# Patient Record
Sex: MALE | Race: WHITE | NOT HISPANIC OR LATINO | URBAN - METROPOLITAN AREA
[De-identification: names, ages, dates, MRNs, and addresses within clinical notes are randomized per-mention and may not be internally consistent; named-entity substitution may affect disease eponyms.]

---

## 2020-03-02 ENCOUNTER — EMERGENCY (EMERGENCY)
Facility: HOSPITAL | Age: 80
LOS: 1 days | Discharge: HOME | End: 2020-03-02
Attending: EMERGENCY MEDICINE | Admitting: EMERGENCY MEDICINE
Payer: MEDICARE

## 2020-03-02 VITALS
HEIGHT: 69 IN | RESPIRATION RATE: 18 BRPM | SYSTOLIC BLOOD PRESSURE: 127 MMHG | TEMPERATURE: 98 F | DIASTOLIC BLOOD PRESSURE: 64 MMHG | WEIGHT: 143.96 LBS | OXYGEN SATURATION: 97 % | HEART RATE: 107 BPM

## 2020-03-02 VITALS
TEMPERATURE: 98 F | SYSTOLIC BLOOD PRESSURE: 107 MMHG | HEART RATE: 84 BPM | OXYGEN SATURATION: 98 % | RESPIRATION RATE: 18 BRPM | DIASTOLIC BLOOD PRESSURE: 68 MMHG

## 2020-03-02 PROCEDURE — 99283 EMERGENCY DEPT VISIT LOW MDM: CPT

## 2020-03-02 NOTE — ED ADULT NURSE NOTE - NSIMPLEMENTINTERV_GEN_ALL_ED
Implemented All Fall with Harm Risk Interventions:  [place] to call system. Call bell, personal items and telephone within reach. Instruct patient to call for assistance. Room bathroom lighting operational. Non-slip footwear when patient is off stretcher. Physically safe environment: no spills, clutter or unnecessary equipment. Stretcher in lowest position, wheels locked, appropriate side rails in place. Provide visual cue, wrist band, yellow gown, etc. Monitor gait and stability. Monitor for mental status changes and reorient to person, place, and time. Review medications for side effects contributing to fall risk. Reinforce activity limits and safety measures with patient and family. Provide visual clues: red socks.

## 2020-03-02 NOTE — ED ADULT TRIAGE NOTE - CHIEF COMPLAINT QUOTE
Pt BIBA for c/o mechanical fall, states a "melody of wind" made him lose his balance, denies LOC, + abrasions to fingers, + knee pain, no head strike/not on AC's

## 2020-03-02 NOTE — ED PROVIDER NOTE - PATIENT PORTAL LINK FT
You can access the FollowMyHealth Patient Portal offered by [facility] by registering at the following website: [email or web address]. By joining [employer]’s FollowMyHealth portal, you will also be able to view your health information using other applications (apps) compatible with our system.

## 2020-03-02 NOTE — ED PROVIDER NOTE - OBJECTIVE STATEMENT
78 yo male biba after witnessed trip and fall. Pt has no physical complaint. States bystander called 911 and when ambulance arrived, he refused medical treatment. Brought to ED anyway by EMS. Denies any physical complaints. Denies LOC/HA/N/V/extremity pain.

## 2020-03-03 ENCOUNTER — EMERGENCY (EMERGENCY)
Facility: HOSPITAL | Age: 80
LOS: 1 days | Discharge: HOME | End: 2020-03-03
Attending: EMERGENCY MEDICINE | Admitting: EMERGENCY MEDICINE
Payer: MEDICARE

## 2020-03-03 VITALS
SYSTOLIC BLOOD PRESSURE: 151 MMHG | HEIGHT: 69 IN | OXYGEN SATURATION: 97 % | TEMPERATURE: 97 F | DIASTOLIC BLOOD PRESSURE: 87 MMHG | WEIGHT: 143.96 LBS | RESPIRATION RATE: 18 BRPM | HEART RATE: 90 BPM

## 2020-03-03 DIAGNOSIS — R53.1 WEAKNESS: ICD-10-CM

## 2020-03-03 DIAGNOSIS — N39.0 URINARY TRACT INFECTION, SITE NOT SPECIFIED: ICD-10-CM

## 2020-03-03 LAB
ALBUMIN SERPL ELPH-MCNC: 3.6 G/DL — SIGNIFICANT CHANGE UP (ref 3.4–5)
ALP SERPL-CCNC: 95 U/L — SIGNIFICANT CHANGE UP (ref 40–120)
ALT FLD-CCNC: 110 U/L — HIGH (ref 12–42)
AMPHET UR-MCNC: NEGATIVE — SIGNIFICANT CHANGE UP
ANION GAP SERPL CALC-SCNC: 15 MMOL/L — SIGNIFICANT CHANGE UP (ref 9–16)
APPEARANCE UR: CLEAR — SIGNIFICANT CHANGE UP
AST SERPL-CCNC: 440 U/L — HIGH (ref 15–37)
BACTERIA # UR AUTO: PRESENT /HPF
BARBITURATES UR SCN-MCNC: NEGATIVE — SIGNIFICANT CHANGE UP
BASOPHILS # BLD AUTO: 0.01 K/UL — SIGNIFICANT CHANGE UP (ref 0–0.2)
BASOPHILS NFR BLD AUTO: 0.1 % — SIGNIFICANT CHANGE UP (ref 0–2)
BENZODIAZ UR-MCNC: NEGATIVE — SIGNIFICANT CHANGE UP
BILIRUB SERPL-MCNC: 1.2 MG/DL — SIGNIFICANT CHANGE UP (ref 0.2–1.2)
BILIRUB UR-MCNC: ABNORMAL
BUN SERPL-MCNC: 45 MG/DL — HIGH (ref 7–23)
CALCIUM SERPL-MCNC: 9 MG/DL — SIGNIFICANT CHANGE UP (ref 8.5–10.5)
CHLORIDE SERPL-SCNC: 104 MMOL/L — SIGNIFICANT CHANGE UP (ref 96–108)
CO2 SERPL-SCNC: 23 MMOL/L — SIGNIFICANT CHANGE UP (ref 22–31)
COCAINE METAB.OTHER UR-MCNC: NEGATIVE — SIGNIFICANT CHANGE UP
COLOR SPEC: YELLOW — SIGNIFICANT CHANGE UP
CREAT SERPL-MCNC: 1.28 MG/DL — SIGNIFICANT CHANGE UP (ref 0.5–1.3)
DIFF PNL FLD: ABNORMAL
EOSINOPHIL # BLD AUTO: 0 K/UL — SIGNIFICANT CHANGE UP (ref 0–0.5)
EOSINOPHIL NFR BLD AUTO: 0 % — SIGNIFICANT CHANGE UP (ref 0–6)
EPI CELLS # UR: ABNORMAL /HPF (ref 0–5)
ETHANOL SERPL-MCNC: <3 MG/DL — SIGNIFICANT CHANGE UP
GLUCOSE SERPL-MCNC: 85 MG/DL — SIGNIFICANT CHANGE UP (ref 70–99)
GLUCOSE UR QL: NEGATIVE — SIGNIFICANT CHANGE UP
HCT VFR BLD CALC: 35.6 % — LOW (ref 39–50)
HGB BLD-MCNC: 11.7 G/DL — LOW (ref 13–17)
HYALINE CASTS # UR AUTO: ABNORMAL /LPF (ref 0–2)
IMM GRANULOCYTES NFR BLD AUTO: 0.4 % — SIGNIFICANT CHANGE UP (ref 0–1.5)
KETONES UR-MCNC: 40 MG/DL
LEUKOCYTE ESTERASE UR-ACNC: NEGATIVE — SIGNIFICANT CHANGE UP
LYMPHOCYTES # BLD AUTO: 0.33 K/UL — LOW (ref 1–3.3)
LYMPHOCYTES # BLD AUTO: 2.9 % — LOW (ref 13–44)
MAGNESIUM SERPL-MCNC: 2.5 MG/DL — SIGNIFICANT CHANGE UP (ref 1.6–2.6)
MANUAL SMEAR VERIFICATION: SIGNIFICANT CHANGE UP
MCHC RBC-ENTMCNC: 32.7 PG — SIGNIFICANT CHANGE UP (ref 27–34)
MCHC RBC-ENTMCNC: 32.9 GM/DL — SIGNIFICANT CHANGE UP (ref 32–36)
MCV RBC AUTO: 99.4 FL — SIGNIFICANT CHANGE UP (ref 80–100)
METHADONE UR-MCNC: NEGATIVE — SIGNIFICANT CHANGE UP
MONOCYTES # BLD AUTO: 0.82 K/UL — SIGNIFICANT CHANGE UP (ref 0–0.9)
MONOCYTES NFR BLD AUTO: 7.3 % — SIGNIFICANT CHANGE UP (ref 2–14)
NEUTROPHILS # BLD AUTO: 10.02 K/UL — HIGH (ref 1.8–7.4)
NEUTROPHILS NFR BLD AUTO: 89.3 % — HIGH (ref 43–77)
NITRITE UR-MCNC: NEGATIVE — SIGNIFICANT CHANGE UP
NRBC # BLD: 0 /100 WBCS — SIGNIFICANT CHANGE UP (ref 0–0)
OPIATES UR-MCNC: NEGATIVE — SIGNIFICANT CHANGE UP
PCP SPEC-MCNC: SIGNIFICANT CHANGE UP
PCP UR-MCNC: NEGATIVE — SIGNIFICANT CHANGE UP
PH UR: 5.5 — SIGNIFICANT CHANGE UP (ref 5–8)
PLAT MORPH BLD: NORMAL — SIGNIFICANT CHANGE UP
PLATELET # BLD AUTO: 229 K/UL — SIGNIFICANT CHANGE UP (ref 150–400)
PLATELET COUNT - ESTIMATE: NORMAL — SIGNIFICANT CHANGE UP
POTASSIUM SERPL-MCNC: 3.6 MMOL/L — SIGNIFICANT CHANGE UP (ref 3.5–5.3)
POTASSIUM SERPL-SCNC: 3.6 MMOL/L — SIGNIFICANT CHANGE UP (ref 3.5–5.3)
PROT SERPL-MCNC: 6.8 G/DL — SIGNIFICANT CHANGE UP (ref 6.4–8.2)
PROT UR-MCNC: 30 MG/DL
RBC # BLD: 3.58 M/UL — LOW (ref 4.2–5.8)
RBC # FLD: 14.1 % — SIGNIFICANT CHANGE UP (ref 10.3–14.5)
RBC BLD AUTO: NORMAL — SIGNIFICANT CHANGE UP
RBC CASTS # UR COMP ASSIST: > 10 /HPF
SODIUM SERPL-SCNC: 142 MMOL/L — SIGNIFICANT CHANGE UP (ref 132–145)
SP GR SPEC: >=1.03 — SIGNIFICANT CHANGE UP (ref 1–1.03)
THC UR QL: NEGATIVE — SIGNIFICANT CHANGE UP
UROBILINOGEN FLD QL: 0.2 E.U./DL — SIGNIFICANT CHANGE UP
WBC # BLD: 11.23 K/UL — HIGH (ref 3.8–10.5)
WBC # FLD AUTO: 11.23 K/UL — HIGH (ref 3.8–10.5)
WBC UR QL: < 5 /HPF — SIGNIFICANT CHANGE UP

## 2020-03-03 PROCEDURE — 72125 CT NECK SPINE W/O DYE: CPT | Mod: 26

## 2020-03-03 PROCEDURE — 70450 CT HEAD/BRAIN W/O DYE: CPT | Mod: 26

## 2020-03-03 PROCEDURE — 99284 EMERGENCY DEPT VISIT MOD MDM: CPT

## 2020-03-03 RX ORDER — AZTREONAM 2 G
1 VIAL (EA) INJECTION
Qty: 20 | Refills: 0
Start: 2020-03-03 | End: 2020-03-12

## 2020-03-03 RX ORDER — CEFUROXIME AXETIL 250 MG
1 TABLET ORAL
Qty: 20 | Refills: 0
Start: 2020-03-03 | End: 2020-03-12

## 2020-03-03 RX ORDER — CEFUROXIME AXETIL 250 MG
250 TABLET ORAL ONCE
Refills: 0 | Status: COMPLETED | OUTPATIENT
Start: 2020-03-03 | End: 2020-03-03

## 2020-03-03 RX ADMIN — Medication 250 MILLIGRAM(S): at 20:25

## 2020-03-03 NOTE — ED ADULT NURSE NOTE - CHPI ED NUR SYMPTOMS NEG
no pain/no chills/no nausea/no weakness/no vomiting/no decreased eating/drinking/no dizziness/no fever/no tingling

## 2020-03-03 NOTE — ED PROVIDER NOTE - PATIENT PORTAL LINK FT
You can access the FollowMyHealth Patient Portal offered by [facility] by registering at the following website: [email or web address]. By joining [employer]’s FollowMyHealth portal, you will also be able to view your health information using other applications (apps) compatible with our system.

## 2020-03-03 NOTE — ED PROVIDER NOTE - PROGRESS NOTE DETAILS
Pt was able to walk to bathroom. UA suggestive of UTI, will culture and start cephalosporin. ON rexamination pt had an area in lower extremities that shows b/l lower extremity erythema, seems chronic, poor hygiene. Will also give script for bactrim in case of overlying celulitic process.

## 2020-03-03 NOTE — ED PROVIDER NOTE - NEUROLOGICAL, MLM
Alert and oriented, no focal deficits. 5/5 strength in bilateral LE. Pt able to stand but appears to be unsteady when attempting to walk.

## 2020-03-03 NOTE — ED PROVIDER NOTE - MUSCULOSKELETAL, MLM
Spine appears normal, c-spine is atraumatic, range of motion is not limited, no muscle or joint tenderness

## 2020-03-03 NOTE — ED PROVIDER NOTE - OBJECTIVE STATEMENT
79 y o male with no pertinent PMHX was BIBA for medical evaluation. Pt was found in the garage of a [employer] c/o weakness and unsteadiness in his legs. Pt notes difficulty ambulating secondary to weakness. He was last seen here yesterday for a mechanical fall but as per document, he denied medical aid on arrival to ED. Pt denies pain, numbness, tingling, or deformities to LE. He denies etoh use or hx of smoking. No other medical complaints endorsed.

## 2020-03-03 NOTE — ED ADULT NURSE NOTE - CHIEF COMPLAINT QUOTE
BIBA after [employer] worker called 911 due to patient in their garage. as per EMS, pt was weak in his legs, unable to bear weight. pt has no complaints at this time but admits to fall sustained yesterday where he was seen at [facility]. pt is A,A&Ox3.

## 2020-03-03 NOTE — ED PROVIDER NOTE - CONSTITUTIONAL, MLM
normal... Disheveled, awake, alert, oriented to person, place, time/situation and in no apparent distress. Pt able to provide hx.

## 2020-03-03 NOTE — ED ADULT TRIAGE NOTE - CHIEF COMPLAINT QUOTE
BIBA after [employer] worker called 911 due to patient in their garage. as per EMS, pt was weak in his legs, unable to bear weight. pt has no complaints at this time but admits to fall sustained yesterday where he was seen at [facility]. pt is A,A&Ox3.

## 2020-03-04 ENCOUNTER — INPATIENT (INPATIENT)
Facility: HOSPITAL | Age: 80
LOS: 2 days | Discharge: NURSING, REHAB OR LONG-TERM CARE | DRG: 565 | End: 2020-03-07
Attending: STUDENT IN AN ORGANIZED HEALTH CARE EDUCATION/TRAINING PROGRAM | Admitting: STUDENT IN AN ORGANIZED HEALTH CARE EDUCATION/TRAINING PROGRAM
Payer: MEDICARE

## 2020-03-04 VITALS
WEIGHT: 139.99 LBS | OXYGEN SATURATION: 98 % | DIASTOLIC BLOOD PRESSURE: 68 MMHG | TEMPERATURE: 99 F | SYSTOLIC BLOOD PRESSURE: 135 MMHG | HEIGHT: 69 IN | HEART RATE: 98 BPM | RESPIRATION RATE: 17 BRPM

## 2020-03-04 DIAGNOSIS — R79.89 OTHER SPECIFIED ABNORMAL FINDINGS OF BLOOD CHEMISTRY: ICD-10-CM

## 2020-03-04 DIAGNOSIS — Z91.89 OTHER SPECIFIED PERSONAL RISK FACTORS, NOT ELSEWHERE CLASSIFIED: ICD-10-CM

## 2020-03-04 DIAGNOSIS — R63.8 OTHER SYMPTOMS AND SIGNS CONCERNING FOOD AND FLUID INTAKE: ICD-10-CM

## 2020-03-04 DIAGNOSIS — L03.90 CELLULITIS, UNSPECIFIED: ICD-10-CM
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
## 2020-03-04 DIAGNOSIS — M62.82 RHABDOMYOLYSIS: ICD-10-CM

## 2020-03-04 LAB
ALBUMIN SERPL ELPH-MCNC: 3.4 G/DL — SIGNIFICANT CHANGE UP (ref 3.4–5)
ALP SERPL-CCNC: 97 U/L — SIGNIFICANT CHANGE UP (ref 40–120)
ALT FLD-CCNC: 118 U/L — HIGH (ref 12–42)
ANION GAP SERPL CALC-SCNC: 13 MMOL/L — SIGNIFICANT CHANGE UP (ref 9–16)
ANION GAP SERPL CALC-SCNC: 14 MMOL/L — SIGNIFICANT CHANGE UP (ref 9–16)
APTT BLD: 26.6 SEC — LOW (ref 27.5–36.3)
AST SERPL-CCNC: 452 U/L — HIGH (ref 15–37)
BASOPHILS # BLD AUTO: 0.01 K/UL — SIGNIFICANT CHANGE UP (ref 0–0.2)
BASOPHILS NFR BLD AUTO: 0.1 % — SIGNIFICANT CHANGE UP (ref 0–2)
BILIRUB SERPL-MCNC: 1.1 MG/DL — SIGNIFICANT CHANGE UP (ref 0.2–1.2)
BUN SERPL-MCNC: 33 MG/DL — HIGH (ref 7–23)
BUN SERPL-MCNC: 44 MG/DL — HIGH (ref 7–23)
CALCIUM SERPL-MCNC: 7.6 MG/DL — LOW (ref 8.5–10.5)
CALCIUM SERPL-MCNC: 9 MG/DL — SIGNIFICANT CHANGE UP (ref 8.5–10.5)
CHLORIDE SERPL-SCNC: 103 MMOL/L — SIGNIFICANT CHANGE UP (ref 96–108)
CHLORIDE SERPL-SCNC: 110 MMOL/L — HIGH (ref 96–108)
CK MB CFR SERPL CALC: 57.9 NG/ML — HIGH (ref 0–6.7)
CK SERPL-CCNC: 4512 U/L — HIGH (ref 30–200)
CK SERPL-CCNC: 5850 U/L — HIGH (ref 39–308)
CK SERPL-CCNC: CRITICAL HIGH U/L (ref 39–308)
CO2 SERPL-SCNC: 21 MMOL/L — LOW (ref 22–31)
CO2 SERPL-SCNC: 22 MMOL/L — SIGNIFICANT CHANGE UP (ref 22–31)
CREAT SERPL-MCNC: 0.83 MG/DL — SIGNIFICANT CHANGE UP (ref 0.5–1.3)
CREAT SERPL-MCNC: 1.17 MG/DL — SIGNIFICANT CHANGE UP (ref 0.5–1.3)
EOSINOPHIL # BLD AUTO: 0 K/UL — SIGNIFICANT CHANGE UP (ref 0–0.5)
EOSINOPHIL NFR BLD AUTO: 0 % — SIGNIFICANT CHANGE UP (ref 0–6)
ETHANOL SERPL-MCNC: <3 MG/DL — SIGNIFICANT CHANGE UP
GLUCOSE SERPL-MCNC: 74 MG/DL — SIGNIFICANT CHANGE UP (ref 70–99)
GLUCOSE SERPL-MCNC: 99 MG/DL — SIGNIFICANT CHANGE UP (ref 70–99)
HCT VFR BLD CALC: 35.1 % — LOW (ref 39–50)
HGB BLD-MCNC: 11.7 G/DL — LOW (ref 13–17)
IMM GRANULOCYTES NFR BLD AUTO: 0.4 % — SIGNIFICANT CHANGE UP (ref 0–1.5)
INR BLD: 1.25 — HIGH (ref 0.88–1.16)
LACTATE SERPL-SCNC: 1.6 MMOL/L — SIGNIFICANT CHANGE UP (ref 0.4–2)
LYMPHOCYTES # BLD AUTO: 0.37 K/UL — LOW (ref 1–3.3)
LYMPHOCYTES # BLD AUTO: 3.7 % — LOW (ref 13–44)
MAGNESIUM SERPL-MCNC: 2.4 MG/DL — SIGNIFICANT CHANGE UP (ref 1.6–2.6)
MCHC RBC-ENTMCNC: 32.8 PG — SIGNIFICANT CHANGE UP (ref 27–34)
MCHC RBC-ENTMCNC: 33.3 GM/DL — SIGNIFICANT CHANGE UP (ref 32–36)
MCV RBC AUTO: 98.3 FL — SIGNIFICANT CHANGE UP (ref 80–100)
MONOCYTES # BLD AUTO: 0.83 K/UL — SIGNIFICANT CHANGE UP (ref 0–0.9)
MONOCYTES NFR BLD AUTO: 8.3 % — SIGNIFICANT CHANGE UP (ref 2–14)
NEUTROPHILS # BLD AUTO: 8.78 K/UL — HIGH (ref 1.8–7.4)
NEUTROPHILS NFR BLD AUTO: 87.5 % — HIGH (ref 43–77)
NRBC # BLD: 0 /100 WBCS — SIGNIFICANT CHANGE UP (ref 0–0)
NT-PROBNP SERPL-SCNC: 795 PG/ML — HIGH
PCP SPEC-MCNC: SIGNIFICANT CHANGE UP
PLATELET # BLD AUTO: 222 K/UL — SIGNIFICANT CHANGE UP (ref 150–400)
POTASSIUM SERPL-MCNC: 2.9 MMOL/L — CRITICAL LOW (ref 3.5–5.3)
POTASSIUM SERPL-MCNC: 3.4 MMOL/L — LOW (ref 3.5–5.3)
POTASSIUM SERPL-SCNC: 2.9 MMOL/L — CRITICAL LOW (ref 3.5–5.3)
POTASSIUM SERPL-SCNC: 3.4 MMOL/L — LOW (ref 3.5–5.3)
PROT SERPL-MCNC: 6.6 G/DL — SIGNIFICANT CHANGE UP (ref 6.4–8.2)
PROTHROM AB SERPL-ACNC: 14 SEC — HIGH (ref 10–12.9)
RBC # BLD: 3.57 M/UL — LOW (ref 4.2–5.8)
RBC # FLD: 14.1 % — SIGNIFICANT CHANGE UP (ref 10.3–14.5)
SODIUM SERPL-SCNC: 139 MMOL/L — SIGNIFICANT CHANGE UP (ref 132–145)
SODIUM SERPL-SCNC: 144 MMOL/L — SIGNIFICANT CHANGE UP (ref 132–145)
TROPONIN I SERPL-MCNC: 0.19 NG/ML — HIGH (ref 0.02–0.06)
TROPONIN I SERPL-MCNC: 0.24 NG/ML — HIGH (ref 0.02–0.06)
TROPONIN T SERPL-MCNC: 0.07 NG/ML — CRITICAL HIGH (ref 0–0.01)
WBC # BLD: 10.03 K/UL — SIGNIFICANT CHANGE UP (ref 3.8–10.5)
WBC # FLD AUTO: 10.03 K/UL — SIGNIFICANT CHANGE UP (ref 3.8–10.5)

## 2020-03-04 PROCEDURE — 71045 X-RAY EXAM CHEST 1 VIEW: CPT | Mod: 26,77

## 2020-03-04 PROCEDURE — 99285 EMERGENCY DEPT VISIT HI MDM: CPT | Mod: 25

## 2020-03-04 PROCEDURE — 70498 CT ANGIOGRAPHY NECK: CPT | Mod: 26

## 2020-03-04 PROCEDURE — 70450 CT HEAD/BRAIN W/O DYE: CPT | Mod: 26,59

## 2020-03-04 PROCEDURE — 70496 CT ANGIOGRAPHY HEAD: CPT | Mod: 26

## 2020-03-04 PROCEDURE — 93010 ELECTROCARDIOGRAM REPORT: CPT | Mod: 76

## 2020-03-04 PROCEDURE — 74177 CT ABD & PELVIS W/CONTRAST: CPT | Mod: 26

## 2020-03-04 PROCEDURE — 99223 1ST HOSP IP/OBS HIGH 75: CPT | Mod: GC

## 2020-03-04 PROCEDURE — 71045 X-RAY EXAM CHEST 1 VIEW: CPT | Mod: 26

## 2020-03-04 RX ORDER — CEFTRIAXONE 500 MG/1
1000 INJECTION, POWDER, FOR SOLUTION INTRAMUSCULAR; INTRAVENOUS ONCE
Refills: 0 | Status: COMPLETED | OUTPATIENT
Start: 2020-03-04 | End: 2020-03-04

## 2020-03-04 RX ORDER — SODIUM CHLORIDE 9 MG/ML
1000 INJECTION INTRAMUSCULAR; INTRAVENOUS; SUBCUTANEOUS ONCE
Refills: 0 | Status: COMPLETED | OUTPATIENT
Start: 2020-03-04 | End: 2020-03-04

## 2020-03-04 RX ORDER — INFLUENZA VIRUS VACCINE 15; 15; 15; 15 UG/.5ML; UG/.5ML; UG/.5ML; UG/.5ML
0.5 SUSPENSION INTRAMUSCULAR ONCE
Refills: 0 | Status: DISCONTINUED | OUTPATIENT
Start: 2020-03-04 | End: 2020-03-07

## 2020-03-04 RX ORDER — ENOXAPARIN SODIUM 100 MG/ML
40 INJECTION SUBCUTANEOUS EVERY 24 HOURS
Refills: 0 | Status: DISCONTINUED | OUTPATIENT
Start: 2020-03-05 | End: 2020-03-07

## 2020-03-04 RX ORDER — CEPHALEXIN 500 MG
500 CAPSULE ORAL EVERY 6 HOURS
Refills: 0 | Status: DISCONTINUED | OUTPATIENT
Start: 2020-03-04 | End: 2020-03-06

## 2020-03-04 RX ORDER — POTASSIUM CHLORIDE 20 MEQ
40 PACKET (EA) ORAL
Refills: 0 | Status: COMPLETED | OUTPATIENT
Start: 2020-03-04 | End: 2020-03-05

## 2020-03-04 RX ORDER — SODIUM CHLORIDE 9 MG/ML
1000 INJECTION INTRAMUSCULAR; INTRAVENOUS; SUBCUTANEOUS
Refills: 0 | Status: DISCONTINUED | OUTPATIENT
Start: 2020-03-04 | End: 2020-03-05

## 2020-03-04 RX ORDER — ASPIRIN/CALCIUM CARB/MAGNESIUM 324 MG
324 TABLET ORAL ONCE
Refills: 0 | Status: COMPLETED | OUTPATIENT
Start: 2020-03-04 | End: 2020-03-04

## 2020-03-04 RX ADMIN — SODIUM CHLORIDE 1000 MILLILITER(S): 9 INJECTION INTRAMUSCULAR; INTRAVENOUS; SUBCUTANEOUS at 05:36

## 2020-03-04 RX ADMIN — SODIUM CHLORIDE 1000 MILLILITER(S): 9 INJECTION INTRAMUSCULAR; INTRAVENOUS; SUBCUTANEOUS at 13:08

## 2020-03-04 RX ADMIN — SODIUM CHLORIDE 1000 MILLILITER(S): 9 INJECTION INTRAMUSCULAR; INTRAVENOUS; SUBCUTANEOUS at 14:04

## 2020-03-04 RX ADMIN — Medication 40 MILLIEQUIVALENT(S): at 22:14

## 2020-03-04 RX ADMIN — CEFTRIAXONE 1000 MILLIGRAM(S): 500 INJECTION, POWDER, FOR SOLUTION INTRAMUSCULAR; INTRAVENOUS at 04:45

## 2020-03-04 RX ADMIN — SODIUM CHLORIDE 500 MILLILITER(S): 9 INJECTION INTRAMUSCULAR; INTRAVENOUS; SUBCUTANEOUS at 04:46

## 2020-03-04 RX ADMIN — SODIUM CHLORIDE 125 MILLILITER(S): 9 INJECTION INTRAMUSCULAR; INTRAVENOUS; SUBCUTANEOUS at 22:14

## 2020-03-04 RX ADMIN — CEFTRIAXONE 100 MILLIGRAM(S): 500 INJECTION, POWDER, FOR SOLUTION INTRAMUSCULAR; INTRAVENOUS at 03:22

## 2020-03-04 RX ADMIN — SODIUM CHLORIDE 125 MILLILITER(S): 9 INJECTION INTRAMUSCULAR; INTRAVENOUS; SUBCUTANEOUS at 13:52

## 2020-03-04 RX ADMIN — Medication 324 MILLIGRAM(S): at 05:39

## 2020-03-04 NOTE — PROVIDER CONTACT NOTE (CRITICAL VALUE NOTIFICATION) - ASSESSMENT
Pt A/Ox4, denies any CP, n/v/d, SOB, dyspnea or pain at this time. Unable to walk due to infection at this time. Pt has had multiple falls recently.

## 2020-03-04 NOTE — ED PROVIDER NOTE - ATTENDING CONTRIBUTION TO CARE
79 yom pw generalized weakness, difficulty to ambulate.  pt was seen here 2 days ago, sp trauma to head, at the time, left AMA.  pt returns earlier tonight, c/o weakness, had CT head/cervical spine done, given abx for possible UTI and cellulitis.  pt given a cane upon dc, however, returns as he was weak and having difficulty ambulating.  pt denies any cp/sob/ha/nv/abd pain.    agree w/ NP, pt ao x 1, to person, does not appear in distress, rrr, lungs clear, strength 5/5 in bl UE, strength 5/5 w/ isolation of bl LE, but not able to ambulate, labs reviewed from prior, will rpt, including ct head/cta head/neck, also added cpk and trop    initial ekg reviewed, no acute findings, however, trop 0.188 w/ normal renal function, at this time, rpt ekg done, which showed T wave inversion in II, aVF, v5, w/ hyperacute T wave in v1, pt again denies any cp/sob, rpt trop ordered again and rpt EKG done again 30 min, which showed the same flipped t wave in III, aVF, now new flipped t wave in v4, now upright v5.  rpt trop in 2 hr appears to be elevated to 0.24    with these findings, I called acute MI attending Dr. Acuna, reviewed EKG w/ pictures sent, who felt pt is not a candidate for STEMI activation.  then called to cardiology attending, again sent EKG via text, reviewed, feels pt is not having NSTEMI, [facility], will consult and obtain echo but does NOT recommend telemetry monitoring, feels pt is appropriate for regional medicine floor.

## 2020-03-04 NOTE — H&P ADULT - ATTENDING COMMENTS
patient seen and examined a/f fall and rhabdomyolysis   reviewed pertinent data, h&p      1. fall/ rhabdo:  on IVFs, monitor CK       rest of plan as above patient seen and examined a/f fall and rhabdomyolysis   reviewed pertinent data, h&p      1. inability to walk; falls/ rhabdo:  on IVFs, monitor CK; +S shaped scoliosis seen on CT a/p , ordered for MRI, followup PT recs, likely will need shoe lift and 4 wheel rolling walker   2. cellulitis: agree w/ kelfex PO     rest of plan as above patient seen and examined a/f fall and rhabdomyolysis   reviewed pertinent data, h&p    elderly male, in NAD, dry MM, s1s2, ?s3 at LLSB, apex;, lungs cta b/l; abdomen soft/nt; negative SLR test b/l; FROM of knees and legs b/l; +eccymosis at the right knee; +abrasions at the lower ext b/l; 5/5 motor b/l at all extremities; no pain on palpation of plantar/ heel of feet b/l; +warm erythema at the R anterior tib region, nontender ; normal rectal tone and perineal sensation is intact (chaperoned by MAXIMINO Goodson)     1. inability to walk; falls/ rhabdo:  on IVFs, monitor CK; +S shaped scoliosis seen on CT a/p , ordered for MRI, followup PT recs, likely will need shoe lift and 4 wheel rolling walker   2. cellulitis: agree w/ kelfex PO     rest of plan as above

## 2020-03-04 NOTE — H&P ADULT - PROBLEM SELECTOR PLAN 1
Pt with few days of unsteady gate and few falls in the past few days that claims all of them have been mechanical and due to loss of balance. Denies any loss of consciousness, headache, dizziness or blurry vision or any symptoms in the past few days. denies any PMH.   In the ED his head and neck imaging have been unremarkable.     - PT/OT eval in the morning   - vitamin B12 and folate in the AM to rule out B12 deficiency due to malnutrition Pt with few days of unsteady gate and few falls in the past few days that claims all of them have been mechanical and due to loss of balance. Denies any loss of consciousness, headache, dizziness or blurry vision or any symptoms in the past few days. denies any PMH. Neuro exam is normal.  In the ED his head and neck imaging have been unremarkable.     - MRI LSspine w/o contrast  - vitamin B12 and folate in the AM to rule out B12 deficiency due to malnutrition  - PT/OT eval in the morning Pt with few days of unsteady gait and few falls in the past few days that claims all of them have been mechanical and due to loss of balance. Denies any loss of consciousness, headache, dizziness or blurry vision or any symptoms in the past few days. denies any PMH. Neuro exam is normal.  In the ED his head and neck imaging have been unremarkable.     - MRI LSspine w/o contrast  - vitamin B12 and folate in the AM to rule out B12 deficiency due to malnutrition  - PT/OT eval in the morning

## 2020-03-04 NOTE — H&P ADULT - PROBLEM SELECTOR PLAN 4
Has a demarcated redness and slight warmness in the right leg but with no tenderness or swelling or abscess. 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 Pt denies any pain and does not remember when it has started. Did not have fever but had one time of hypothermia in ED. His WBC was 11.23 at the time of presentation and improved with few doses of ABx (including a dose of ceftriaxone).     - started him on cephalexin 500 q6 for 4 days  - will reassess in the morning Has a demarcated redness and slight warmness in the right leg but with no tenderness or swelling or abscess. 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 Pt denies any pain and does not remember when it has started. Did not have fever but had one time of hypothermia in ED. His WBC was 11.23 at the time of presentation and improved with few doses of ABx (including a dose of ceftriaxone).     - started him on cephalexin 500 q6 for 5 days  - will reassess in the morning Pt has been in ED for few days with no movement and no food. His CK was elevated as well as his Trop I (0.18 to 0.24). He had some ECG changes including TWI in inferior and lateral leads but cardiology cleared the pt. As per chart the trop changes are because of rhabdomyolysis and not ACS. Pt denies any chest pain or SOB.   Trop T was 0.07 and CKMB was 57.9 here. Pt still asymptomatic.    - Trend trops to [place]  - Will call cardiology in am to update them about the changes in trop and CKMB  - ECG for morning to monitor the changes  - echo ordered for the morning

## 2020-03-04 NOTE — ED PROVIDER NOTE - SKIN TEMP
dry/brawny legs bilat, including feet extending up to knees.  superificial skin tears to R anterior shins.  Bilat legs are equal in temperature and erythema./warm

## 2020-03-04 NOTE — PATIENT PROFILE ADULT - FUNCTIONAL SCREEN CURRENT LEVEL: SWALLOWING (IF SCORE 2 OR MORE FOR ANY ITEM, CONSULT REHAB SERVICES), MLM)
0 = swallows foods/liquids without difficulty [facility]

## 2020-03-04 NOTE — H&P ADULT - NSHPLABSRESULTS_GEN_ALL_CORE
11.7   10.03 )-----------( 222      ( 04 Mar 2020 03:18 )             35.1   03-04    144  |  110<H>  |  33<H>  ----------------------------<  74  2.9<LL>   |  21<L>  |  0.83    Ca    7.6<L>      04 Mar 2020 13:58  Mg     2.4     03-04    TPro  6.6  /  Alb  3.4  /  TBili  1.1  /  DBili  x   /  AST  452<H>  /  ALT  118<H>  /  AlkPhos  97  03-04  < from: CT Head No Cont (03.04.20 @ 04:38) >    IMPRESSION:   No acute intracranial hemorrhage, mass effect, or recent transcortical infarction. No significant change since prior CT dated 3/3/2020.    < from: CT Angio Neck w/ IV Cont (03.04.20 @ 04:14) >    IMPRESSION:  No high-grade stenosis or occlusion in the neck.IMPRESSION:  No high-grade intracranial stenosis or occlusion.    < from: CT Abdomen and Pelvis w/ IV Cont (03.04.20 @ 04:13) >    Impression:   No acute abnormality is identifiedINTERPRETATION:  CT SCAN OF ABDOMEN AND PELVIS    < from: CT Cervical Spine No Cont (03.03.20 @ 18:59) >    CT SCAN OF THE CERVICAL SPINE WITHOUT CONTRAST: IMPRESSION:     1. Evaluation moderately limited due to patient motion particularly upper cervical spine. No fracture or dislocation identified.   2. Multilevel cervical spondylosis, as above.

## 2020-03-04 NOTE — ED ADULT NURSE REASSESSMENT NOTE - NSIMPLEMENTINTERV_GEN_ALL_ED
Implemented All Fall with Harm Risk Interventions:  [place] to call system. Call bell, personal items and telephone within reach. Instruct patient to call for assistance. Room bathroom lighting operational. Non-slip footwear when patient is off stretcher. Physically safe environment: no spills, clutter or unnecessary equipment. Stretcher in lowest position, wheels locked, appropriate side rails in place. Provide visual cue, wrist band, yellow gown, etc. Monitor gait and stability. Monitor for mental status changes and reorient to person, place, and time. Review medications for side effects contributing to fall risk. Reinforce activity limits and safety measures with patient and family. Provide visual clues: red socks.
Implemented All Fall Risk Interventions:  [place] to call system. Call bell, personal items and telephone within reach. Instruct patient to call for assistance. Room bathroom lighting operational. Non-slip footwear when patient is off stretcher. Physically safe environment: no spills, clutter or unnecessary equipment. Stretcher in lowest position, wheels locked, appropriate side rails in place. Provide visual cue, wrist band, yellow gown, etc. Monitor gait and stability. Monitor for mental status changes and reorient to person, place, and time. Review medications for side effects contributing to fall risk. Reinforce activity limits and safety measures with patient and family.

## 2020-03-04 NOTE — H&P ADULT - PROBLEM SELECTOR PLAN 5
Fluids: s/p __  cc/hr, encourage PO intake   Electrolytes-replete as needed  Nutrition - DASH/TLC  Code- Full Code  DVT ppx: Lovenox  GI ppx: none needed  DIspo: [facility] monitor BMP, replete prn Has a demarcated redness and slight warmness in the right leg but with no tenderness or swelling or abscess. 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 Pt denies any pain and does not remember when it has started. Did not have fever but had one time of hypothermia in ED. His WBC was 11.23 at the time of presentation and improved with few doses of ABx (including a dose of ceftriaxone).     - started him on cephalexin 500 q6 for 5 days  - will reassess in the morning

## 2020-03-04 NOTE — ED PROVIDER NOTE - PROGRESS NOTE DETAILS
[place] state missing person list checked.  Pt is not registered as a missing person at present. CK~14,000.  Pt trop 0.188.  Pt denies CP.  Trop is likely secondary ischemia due to rhabdomyolysis.  Will start gentle IV hydration.  Repeat Trop ordered. Pt continues to have EKG changes as documented.  He denies CP/SOB.  Dr. Heath discussed with Dr. Acuna.  EKG images reviewed/shared and determined this is not a STEMI.  Pt then discussed with Dr. Hartmann, [facility] cardiology.  EKG images reviewed/shared and determined changes are not NSTEMI and sequela of rhabdomyolysis.  She recommends echo, non-tele medicine admission with cards consult. Pt discussed with Dr. Jordan and DEYSI.  Pt accepted to [facility] medicine, awaiting admission bed.

## 2020-03-04 NOTE — H&P ADULT - NSHPPHYSICALEXAM_GEN_ALL_CORE
VITAL SIGNS:  T(C): 36.6 (03-04-20 @ 21:15), Max: 37.2 (03-04-20 @ 01:51)  T(F): 97.9 (03-04-20 @ 21:15), Max: 98.9 (03-04-20 @ 01:51)  HR: 71 (03-04-20 @ 21:15) (63 - 98)  BP: 124/73 (03-04-20 @ 21:15) (124/73 - 167/84)  BP(mean): --  RR: 18 (03-04-20 @ 21:15) (16 - 18)  SpO2: 100% (03-04-20 @ 21:15) (98% - 100%)  Wt(kg): --    PHYSICAL EXAM:  Constitutional: WDWN resting comfortably in bed; NAD, cachectic   Head: NC/AT  Eyes: PERRL, EOMI, anicteric sclera  ENT: no nasal discharge; uvula midline, no oropharyngeal erythema or exudates; MMM  Neck: supple; no JVD or thyromegaly  Respiratory: CTA B/L; no W/R/R, no retractions  Cardiac: +S1/S2; RRR; no M/R/G; PMI non-displaced  Gastrointestinal: abdomen soft, NT/ND; no rebound or guarding; +BSx4  Back: spine midline, no bony tenderness or step-offs; no CVAT B/L  Extremities: WWP, no clubbing or cyanosis; no peripheral edema, has a redness in the right leg that is not tender but is slightly warmer than the other side. the redness is well demarcated. but there is no edema.    Musculoskeletal: NROM x4; no joint swelling, tenderness or erythema,   Vascular: 2+ radial, femoral, DP/PT pulses B/L  Dermatologic: skin warm, dry and intact; no rashes, has a redness in the right leg that is not tender but is slightly warmer than the other side. the redness is well demarcated. but there is no edema.   Lymphatic: no submandibular or cervical LAD  Neurologic: AAOx3; CNII-XII grossly intact; no focal deficits, walking deferred   Psychiatric: affect and characteristics of appearance, verbalizations, behaviors are appropriate

## 2020-03-04 NOTE — ED PROVIDER NOTE - OBJECTIVE STATEMENT
78 y/o M, denies PMH, returns to ED after recent evaluation approx 5 hours ago.  Pt seen in ED 2 days ago s/p reported witnessed mechanical fall.  He refused medical care and was discharged.  Today, pt returned to ED last night via EMS with c/o weakness to bilat lower extremities.  He picked up from [employer].  Pt had labs, CT head and cervical spine, and UA. Pt was diagnosed with UTI and possible early cellulitis of RLE.  He was prescribed Ceftin and Bactrim.  Pt was given a cane upon discharge.  Pt noted to have not left ED grounds.  He was sitting outside of ED and c/o difficulty ambulating when he was assisted back into department.      Now, pt noted to be alert, oriented to person only.  He is aware of his name and .  He does not know where he is, the date, or the facility.  He now states he lives independently in [place].  His listed address with prior registration is not a valid address.  Pt provided a new address and it is also an invalid address.  Pt has wallet with $100 dollar bill and small bills.  He has an old work ID from [email or web address].  Pt initially denied any complaints.  He denies fevers/chills, chest pain, cough, SOB, abd pain, n/v/d, weakness, numbness.  He later c/o R foot pain and poor balance.

## 2020-03-04 NOTE — H&P ADULT - PROBLEM SELECTOR PLAN 7
Fluids: s/p __  cc/hr, encourage PO intake   Electrolytes-replete as needed  Nutrition - DASH/TLC  Code- Full Code  DVT ppx: Lovenox  GI ppx: none needed  DIspo: [facility] ADDENDUM: seen on CT a/p; possible mechanical cause of gait issues; 5/5 motor strength of the lower ext; pt w/ episode of soiling self at [facility] as unable to get to bathroom in timely manner (less likely dysfunction); further imaging w/ MRI ordered for further clarification of sxs. will likely need rollator instead of cane for ambulation.

## 2020-03-04 NOTE — H&P ADULT - HISTORY OF PRESENT ILLNESS
A 79 years old male with no PMH, presented to [facility] initially on Saturday because of fall. as per pt the fall was a mechanical fall that was witnessed by a passenger. States that since around few days before that he started feeling unbalanced and had fallen few times before that and all of them were mechanical fall w/o losing his consciousness. In ED he refused to get any care and signed out AMA but as per chart and the pt, he never was able to leave the ED. States that after discharge he tried to take the train to go to his apartment in [place] but he could not (because felt so weak) and had to stay in ED. He also states that when he tried to stand up and go home he fell in front of the ED and so somebody helped him to go back to ED. This time he underwent head CT as well as CTA head and neck and CT abd/pelvis that were normal with no acute changes. His labs showed high CK as well as elevated Trop I with some ECG changes (unknown chronicity), and so the cardiology team was consulted but the decision was not to pursue any cardiac work up since the lab changes were due to rhabdomyolysis and not ACS. Pt received a dose of ceftriaxone and 2 liters of fluid and was transferred to [facility] for further assessment. His CK came down from 29940 to >5000 after receiving fluids.   Pt denied having headache, dizziness, blurry vision, nausea, vomiting, chest pain, shortness of breath, cough, abdominal pain, diarrhea, constipation, urinary symptoms, rash, joint pain, fever, chills, sick contact or recent travel during the past few days. States that has eaten and drunk well (as his usual).  In [facility] his vitals were stable although he had a T of 96.5 last night in [facility]. His CBC showed WBC 10.03, Hb 11.7. Na was 144, K 2.9, BUN 33 and Cr 0.83, Ca 7.6. he was started on 125 ml/hr of normal saline.

## 2020-03-04 NOTE — ED PROVIDER NOTE - SEVERE SEPSIS ALERT DETAILS
troponin elevated, new ekg findings, current process more suspicious for acute cardiac process, unclear current cardiac function and ejection fraction, no prior/baseline for comparison, 30cc/kg fluids not given upon initial eval. troponin elevated, new ekg findings, suspicious for acute cardiac process, there's brawny edema to bl LE, which could suggest active volume overload, unclear current cardiac function and ejection fraction, 30cc/kg fluids not given upon initial eval.

## 2020-03-04 NOTE — H&P ADULT - PROBLEM SELECTOR PLAN 3
Pt has been in ED for few days with no movement and no food. His CK was elevated as well as his Trop I (0.18 to 0.24). He had some ECG changes including TWI in inferior and lateral leads but cardiology cleared the pt. As per chart the trop changes are because of rhabdomyolysis and not ACS. Pt denies any chest pain or SOB.   Trop T was 0.07 and CKMB was 57.9 here. Pt still asymptomatic.    - Trend trops to [place]  - Will call cardiology with any symptoms or uptrending trop or CKMB  - ECG for morning to monitor the changes Pt has been in ED for few days with no movement and no food. His CK was elevated as well as his Trop I (0.18 to 0.24). He had some ECG changes including TWI in inferior and lateral leads but cardiology cleared the pt. As per chart the trop changes are because of rhabdomyolysis and not ACS. Pt denies any chest pain or SOB.   Trop T was 0.07 and CKMB was 57.9 here. Pt still asymptomatic.    - Trend trops to [place]  - Will call cardiology in am to update them about the changes in trop and CKMB  - ECG for morning to monitor the changes  - echo ordered for the morning Pt initially presented to ED 4 days before admission for mechanical fall. but could never leave the ED because of weakness and fall. During this time he has been sitting with minimal movement and could not eat or drink.   In his second visit in ED his CK was elevated to around 14338. His trop I was also elevated. Pt received 2 liters of NS and his repeat CK was 5850. His BUN and Cr are normal and denies any symptoms.     - Switched to  cc/hr  - Monitor kidney function   - No need to trend CK since is downtrending and is around 5000 now

## 2020-03-04 NOTE — H&P ADULT - PROBLEM SELECTOR PLAN 6
1) PCP Contacted on Admission: (Y/N) --> Name & Phone #:  2) Date of Contact with PCP:  3) PCP Contacted at Discharge: (Y/N, N/A)  4) Summary of Handoff Given to PCP:   5) Post-Discharge Appointment Date and Location: monitor H/H, check iron studies, b12/folate monitor BMP, replete prn

## 2020-03-04 NOTE — H&P ADULT - ASSESSMENT
A 79 years old male with no PMH, presented to [facility] initially on Saturday because of fall. was diagnosed with UTI and cellulitis and was discharged with ABx but could not leave ED and had a second fall in front of ED and so was admitted for weakness and fall evaluation.

## 2020-03-04 NOTE — H&P ADULT - PROBLEM/PLAN-9
please use ice on the area    please take tylenol or motrin for the pain    please follow up with your primary care doctor    Esguince de tobillo  Ankle Sprain  Introducción  Image   El esguince de tobillo es la distensión o el desgarro de un ligamento del tobillo. Los ligamentos son tejidos que conectan los huesos.    Los dos tipos de esguince de tobillo más frecuentes son los siguientes:  Esguince por inversión. [place] sucede cuando el pie gira hacia adentro y el tobillo gira hacia afuera. Afecta el ligamento de la parte externa del pie (ligamento lateral).  Esguince por eversión. [place] sucede cuando el pie gira hacia afuera y el tobillo gira hacia adentro. Afecta el ligamento de la parte interna del pie (ligamento medial).  ¿Cuáles son las causas?  A menudo, esta afección es consecuencia de girar o torcer accidentalmente el tobillo.    ¿Qué incrementa el riesgo?  Es más probable que esta afección se manifieste en las personas que practican deportes.    ¿Cuáles son los signos o los síntomas?  ImageLos síntomas de esta afección incluyen los siguientes:  Dolor en el tobillo.  Hinchazón.  Moretones. Pueden aparecer hematomas inmediatamente después del esguince de tobillo, o 1 a 2 días después.  Problemas para estar de pie o caminar, en especial cuando gira o cambia de dirección.  ¿Cómo se diagnostica?  Esta afección se diagnostica mediante un examen físico. Anmol el examen, el médico le presionará ciertas partes del pie y el tobillo e intentará moverlas de formas determinadas. Es posible que le tomen radiografías para determinar la gravedad del esguince y para detectar si hay huesos fracturados.    ¿Cómo se trata?  El tratamiento de esta afección puede incluir lo siguiente:  Un dispositivo ortopédico. Se utiliza para evitar los movimientos del tobillo hasta que se cure.  Pia venda elástica. Se utiliza para sostener el tobillo.  Muletas.  Analgésicos.  Cirugía. [place] puede ser necesario en casos graves.  Fisioterapia. Puede ayudar a mejorar la amplitud de movimiento del tobillo.  Siga estas indicaciones en khan casa:  Image   Mantenga el tobillo en reposo.  Neihart los medicamentos de venta victor manuel y los recetados solamente gi se lo haya indicado el médico.  Anmol 2 o 3 días, mantenga el tobillo en alto (elevado), por encima del nivel del corazón tanto gi sea posible.  Si se lo indican, aplique hielo sobre la juan:  Ponga el hielo en pia bolsa plástica.  Coloque pia toalla entre la piel y la bolsa de hielo.  Coloque el hielo anmol 20 minutos, de 2 a 3 veces por día.  Si le pusieron un dispositivo ortopédico:  Úselo gi se le hayan indicado.  Quítesela para ducharse o para bañarse.  Trate de no  mucho el tobillo, sofiya mueva los dedos de vez en cuando. [place] ayuda a evitar la hinchazón.  Si le pusieron pia venda elástica (vendaje):  Quítesela para ducharse o para bañarse.  Trate de no  mucho el tobillo, sofiya mueva los dedos de vez en cuando. [place] ayuda a evitar la hinchazón.  Si siente que el vendaje está muy ajustado, puede aflojarlo un poco para estar más cómodo.  Afloje el vendaje si tiene adormecimiento u hormigueo en el pie o si lo siente frío y está de color candice.  Si tiene muletas, úselas gi se lo haya indicado el médico.  Comuníquese con un médico si:  Le aumenta rápidamente el hematoma o la hinchazón.  El dolor no se bhanu con los medicamentos.  Solicite ayuda de inmediato si:  El pie o los dedos del pie se le adormecen o se ponen de color candice.  Siente un dolor intenso que empeora.  Resumen  El esguince de tobillo es la distensión o el desgarro de un ligamento del tobillo. Los ligamentos son tejidos que conectan los huesos.  Para aliviar el dolor y la hinchazón, coloque hielo sobre el tobillo afectado, levante el tobillo por encima del nivel del corazón y use pia venda elástica. Además, anastasia reposo gi se lo haya indicado el médico.  Esta información no tiene gi fin reemplazar el consejo del médico. Asegúrese de hacerle al médico cualquier pregunta que tenga.    Document Released: 12/18/2006 Document Revised: 02/10/2019 Document Reviewed: 07/19/2016  Elsevier Interactive Patient Education © 2019 [employer] DISPLAY PLAN FREE TEXT

## 2020-03-04 NOTE — ED PROVIDER NOTE - NEUROLOGICAL LEVEL OF CONSCIOUSNESS
follows commands/oriented to person, clear speech, 5/5 strength to extremities x4.  Pt unable to ambulate without holding onto wall or furniture.  Pt has difficulty bearing weight on legs and unsteady even when standing./alert

## 2020-03-04 NOTE — H&P ADULT - PROBLEM SELECTOR PLAN 8
1) PCP Contacted on Admission: (Y/N) --> Name & Phone #:  2) Date of Contact with PCP:  3) PCP Contacted at Discharge: (Y/N, N/A)  4) Summary of Handoff Given to PCP:   5) Post-Discharge Appointment Date and Location: monitor H/H, check iron studies, b12/folate

## 2020-03-04 NOTE — ED PROVIDER NOTE - CLINICAL SUMMARY MEDICAL DECISION MAKING FREE TEXT BOX
78 y/o M returns to ED with AMS and lower extremity weakness and ataxia.  Pt unkempt/disheveled.  Pt noted to be hypothermic 96.5 core temp.  Pt has FROM of extremities. 78 y/o M returns to ED with AMS and lower extremity weakness and ataxia.  Pt unkempt/disheveled.  Pt noted to be hypothermic 96.5 core temp.  Pt has FROM of extremities.  Pt had elevated trop and CK. 80 y/o M returns to ED with AMS and lower extremity weakness and ataxia.  Pt unkempt/disheveled.  Pt noted to be hypothermic 96.5 core temp.  Pt has FROM of extremities.  Pt had elevated trop and CK consistent with demand ischemia secondary to rhabdomyolysis.  Repeat EKG shows T wave inversions and dynamic changes.  Pt discussed with cardiology.  EKG changes and elevating trops are secondary to rhabdomyolysis and not a STEMI or NSTEMI.  Telemonitoring was not recommended.  Pt admitted to [facility] medicine.

## 2020-03-04 NOTE — ED ADULT NURSE REASSESSMENT NOTE - NS ED NURSE REASSESS COMMENT FT1
Pt urinated on himself therefore pt and sheets cleaned and changed, pt able to pass small amount of urine on bottle, urine sent to lab pt now in clean dry bed clothes, remains on cardiac monitor nil c/o pain,
pt care handed over to myself, introduced self to patient, given ivabs, NKDA, en route to cat scan currently, other rn madison placed iv and sent bloods with thanks
repeat ecg obtained and shown to dr schmidt, roby c/o pain
Patient resting comfortably. Awaiting bed NAD
Patient resting comfortably. Denies any chest pains NAD
Patient received from SATINDER Mccallum. Patient AOX3 with periods of forgetfulness. Patient noted with estrella lower leg redness R>L. Denies any chest pains or shortness of breath.
pt moved to room 6. Pt undressed, +fecal incontinence. Pt was cleaned, with comfort care provided. Pt was evaluated by NP Colon at bedside. Labs sent. 20g PIV inserted to left arm. Pt currently denies pain. Pt noted with unsteady gait on assessment, unable to walk without assistance. Pt states he's from '[place]" but unable to provide exact address. Pt also adds he lives alone. [place] the city [place] bus [place]. Pt appears confused, unknown baseline.

## 2020-03-04 NOTE — H&P ADULT - PROBLEM SELECTOR PLAN 9
Fluids: s/p __  cc/hr, encourage PO intake   Electrolytes-replete as needed  Nutrition - DASH/TLC  Code- Full Code  DVT ppx: Lovenox  GI ppx: none needed  DIspo: [facility]

## 2020-03-04 NOTE — ED PROVIDER NOTE - MUSCULOSKELETAL, MLM
Spine appears normal, range of motion is not limited, no muscle or joint tenderness.  NO midline spinal TTP, pelvis stable, FROM of extremities x4

## 2020-03-04 NOTE — ED ADULT NURSE NOTE - CHIEF COMPLAINT QUOTE
requesting wheelchair unable to ambulate without pain to rle. admits to recent fall. discharged and ambulated out of ed with cane @9pm 3/3/2020.

## 2020-03-04 NOTE — H&P ADULT - NSHPSOCIALHISTORY_GEN_ALL_CORE
He is single and lives alone in the basement of a building in [place].   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   Does not smoke and does not drink alcohol and denies illicit drugs.

## 2020-03-04 NOTE — ED PROVIDER NOTE - CARE PLAN
Principal Discharge DX:	Rhabdomyolysis Principal Discharge DX:	Rhabdomyolysis  Secondary Diagnosis:	Troponin level elevated  Secondary Diagnosis:	EKG abnormalities Principal Discharge DX:	Rhabdomyolysis  Secondary Diagnosis:	Troponin level elevated  Secondary Diagnosis:	EKG abnormalities  Secondary Diagnosis:	AMS (altered mental status)

## 2020-03-04 NOTE — H&P ADULT - PROBLEM SELECTOR PLAN 2
Pt initially presented to ED 4 days before admission for mechanical fall. but could never leave the ED because of weakness and fall. During this time he has been sitting with minimal movement and could not eat or drink.   In his second visit in ED his CK was elevated to around 14708. His trop I was also elevated. Pt received 2 liters of NS and his repeat CK was 5850. His BUN and Cr are normal and denies any symptoms.     - Continue with  cc/hr  - Monitor kidney function   - No need to trend CK since is downtrending and is around 5000 now Pt initially presented to ED 4 days before admission for mechanical fall. but could never leave the ED because of weakness and fall. During this time he has been sitting with minimal movement and could not eat or drink.   In his second visit in ED his CK was elevated to around 19676. His trop I was also elevated. Pt received 2 liters of NS and his repeat CK was 5850. His BUN and Cr are normal and denies any symptoms.     - Switched to  cc/hr  - Monitor kidney function   - No need to trend CK since is downtrending and is around 5000 now see above

## 2020-03-04 NOTE — ED ADULT TRIAGE NOTE - CHIEF COMPLAINT QUOTE
requesting wheelchair unable to ambulate without pain to rle. denies any injury/trauma. discharged and ambulated out of ed with cane @9pm 3/3/2020. requesting wheelchair unable to ambulate without pain to rle. admits to recent fall. discharged and ambulated out of ed with cane @9pm 3/3/2020.

## 2020-03-04 NOTE — H&P ADULT - PROBLEM SELECTOR PROBLEM 7
Nutrition, metabolism, and development symptoms Scoliosis, unspecified scoliosis type, unspecified spinal region

## 2020-03-05 ENCOUNTER — TRANSCRIPTION ENCOUNTER (OUTPATIENT)
Age: 80
End: 2020-03-05

## 2020-03-05 DIAGNOSIS — M62.82 RHABDOMYOLYSIS: ICD-10-CM

## 2020-03-05 DIAGNOSIS — E87.6 HYPOKALEMIA: ICD-10-CM

## 2020-03-05 DIAGNOSIS — R79.89 OTHER SPECIFIED ABNORMAL FINDINGS OF BLOOD CHEMISTRY: ICD-10-CM

## 2020-03-05 DIAGNOSIS — R26.81 UNSTEADINESS ON FEET: ICD-10-CM

## 2020-03-05 DIAGNOSIS — B35.1 TINEA UNGUIUM: ICD-10-CM

## 2020-03-05 DIAGNOSIS — R29.6 REPEATED FALLS: ICD-10-CM

## 2020-03-05 DIAGNOSIS — L98.9 DISORDER OF THE SKIN AND SUBCUTANEOUS TISSUE, UNSPECIFIED: ICD-10-CM

## 2020-03-05 DIAGNOSIS — R74.0 NONSPECIFIC ELEVATION OF LEVELS OF TRANSAMINASE AND LACTIC ACID DEHYDROGENASE [LDH]: ICD-10-CM

## 2020-03-05 DIAGNOSIS — D64.9 ANEMIA, UNSPECIFIED: ICD-10-CM

## 2020-03-05 DIAGNOSIS — L03.115 CELLULITIS OF RIGHT LOWER LIMB: ICD-10-CM

## 2020-03-05 DIAGNOSIS — D53.9 NUTRITIONAL ANEMIA, UNSPECIFIED: ICD-10-CM

## 2020-03-05 DIAGNOSIS — M41.9 SCOLIOSIS, UNSPECIFIED: ICD-10-CM

## 2020-03-05 DIAGNOSIS — R26.2 DIFFICULTY IN WALKING, NOT ELSEWHERE CLASSIFIED: ICD-10-CM

## 2020-03-05 LAB
ALBUMIN SERPL ELPH-MCNC: 2.7 G/DL — LOW (ref 3.3–5)
ALP SERPL-CCNC: 66 U/L — SIGNIFICANT CHANGE UP (ref 40–120)
ALT FLD-CCNC: 69 U/L — HIGH (ref 10–45)
ANION GAP SERPL CALC-SCNC: 13 MMOL/L — SIGNIFICANT CHANGE UP (ref 5–17)
AST SERPL-CCNC: 174 U/L — HIGH (ref 10–40)
BASOPHILS # BLD AUTO: 0.01 K/UL — SIGNIFICANT CHANGE UP (ref 0–0.2)
BASOPHILS NFR BLD AUTO: 0.2 % — SIGNIFICANT CHANGE UP (ref 0–2)
BILIRUB SERPL-MCNC: 0.6 MG/DL — SIGNIFICANT CHANGE UP (ref 0.2–1.2)
BUN SERPL-MCNC: 23 MG/DL — SIGNIFICANT CHANGE UP (ref 7–23)
CALCIUM SERPL-MCNC: 7.7 MG/DL — LOW (ref 8.4–10.5)
CHLORIDE SERPL-SCNC: 112 MMOL/L — HIGH (ref 96–108)
CHOLEST SERPL-MCNC: 131 MG/DL — SIGNIFICANT CHANGE UP (ref 10–199)
CK MB CFR SERPL CALC: 44.8 NG/ML — HIGH (ref 0–6.7)
CK SERPL-CCNC: 3374 U/L — HIGH (ref 30–200)
CO2 SERPL-SCNC: 19 MMOL/L — LOW (ref 22–31)
CREAT SERPL-MCNC: 0.76 MG/DL — SIGNIFICANT CHANGE UP (ref 0.5–1.3)
EOSINOPHIL # BLD AUTO: 0.08 K/UL — SIGNIFICANT CHANGE UP (ref 0–0.5)
EOSINOPHIL NFR BLD AUTO: 1.4 % — SIGNIFICANT CHANGE UP (ref 0–6)
FERRITIN SERPL-MCNC: 289 NG/ML — SIGNIFICANT CHANGE UP (ref 30–400)
FOLATE SERPL-MCNC: 11.7 NG/ML — SIGNIFICANT CHANGE UP
GLUCOSE SERPL-MCNC: 66 MG/DL — LOW (ref 70–99)
HBA1C BLD-MCNC: 5.1 % — SIGNIFICANT CHANGE UP (ref 4–5.6)
HCT VFR BLD CALC: 31.2 % — LOW (ref 39–50)
HDLC SERPL-MCNC: 52 MG/DL — SIGNIFICANT CHANGE UP
HGB BLD-MCNC: 9.8 G/DL — LOW (ref 13–17)
IMM GRANULOCYTES NFR BLD AUTO: 0.5 % — SIGNIFICANT CHANGE UP (ref 0–1.5)
IRON SATN MFR SERPL: 21 % — SIGNIFICANT CHANGE UP (ref 16–55)
IRON SATN MFR SERPL: 35 UG/DL — LOW (ref 45–165)
LIPID PNL WITH DIRECT LDL SERPL: 67 MG/DL — SIGNIFICANT CHANGE UP
LYMPHOCYTES # BLD AUTO: 0.43 K/UL — LOW (ref 1–3.3)
LYMPHOCYTES # BLD AUTO: 7.5 % — LOW (ref 13–44)
MAGNESIUM SERPL-MCNC: 2 MG/DL — SIGNIFICANT CHANGE UP (ref 1.6–2.6)
MCHC RBC-ENTMCNC: 31.4 GM/DL — LOW (ref 32–36)
MCHC RBC-ENTMCNC: 32.8 PG — SIGNIFICANT CHANGE UP (ref 27–34)
MCV RBC AUTO: 104.3 FL — HIGH (ref 80–100)
MONOCYTES # BLD AUTO: 0.58 K/UL — SIGNIFICANT CHANGE UP (ref 0–0.9)
MONOCYTES NFR BLD AUTO: 10.1 % — SIGNIFICANT CHANGE UP (ref 2–14)
NEUTROPHILS # BLD AUTO: 4.61 K/UL — SIGNIFICANT CHANGE UP (ref 1.8–7.4)
NEUTROPHILS NFR BLD AUTO: 80.3 % — HIGH (ref 43–77)
NRBC # BLD: 0 /100 WBCS — SIGNIFICANT CHANGE UP (ref 0–0)
PHOSPHATE SERPL-MCNC: 2.4 MG/DL — LOW (ref 2.5–4.5)
PLATELET # BLD AUTO: 165 K/UL — SIGNIFICANT CHANGE UP (ref 150–400)
POTASSIUM SERPL-MCNC: 4.4 MMOL/L — SIGNIFICANT CHANGE UP (ref 3.5–5.3)
POTASSIUM SERPL-SCNC: 4.4 MMOL/L — SIGNIFICANT CHANGE UP (ref 3.5–5.3)
PROT SERPL-MCNC: 4.8 G/DL — LOW (ref 6–8.3)
RBC # BLD: 2.99 M/UL — LOW (ref 4.2–5.8)
RBC # FLD: 14.1 % — SIGNIFICANT CHANGE UP (ref 10.3–14.5)
SODIUM SERPL-SCNC: 144 MMOL/L — SIGNIFICANT CHANGE UP (ref 135–145)
TIBC SERPL-MCNC: 165 UG/DL — LOW (ref 220–430)
TOTAL CHOLESTEROL/HDL RATIO MEASUREMENT: 2.5 RATIO — LOW (ref 3.4–9.6)
TRIGL SERPL-MCNC: 62 MG/DL — SIGNIFICANT CHANGE UP (ref 10–149)
TROPONIN T SERPL-MCNC: 0.06 NG/ML — CRITICAL HIGH (ref 0–0.01)
UIBC SERPL-MCNC: 130 UG/DL — SIGNIFICANT CHANGE UP (ref 110–370)
VIT B12 SERPL-MCNC: <150 PG/ML — LOW (ref 232–1245)
WBC # BLD: 5.74 K/UL — SIGNIFICANT CHANGE UP (ref 3.8–10.5)
WBC # FLD AUTO: 5.74 K/UL — SIGNIFICANT CHANGE UP (ref 3.8–10.5)

## 2020-03-05 PROCEDURE — 99233 SBSQ HOSP IP/OBS HIGH 50: CPT | Mod: GC

## 2020-03-05 PROCEDURE — 93306 TTE W/DOPPLER COMPLETE: CPT | Mod: 26

## 2020-03-05 PROCEDURE — 73130 X-RAY EXAM OF HAND: CPT | Mod: 26,RT

## 2020-03-05 RX ORDER — SODIUM CHLORIDE 9 MG/ML
1000 INJECTION, SOLUTION INTRAVENOUS
Refills: 0 | Status: DISCONTINUED | OUTPATIENT
Start: 2020-03-05 | End: 2020-03-06

## 2020-03-05 RX ORDER — PREGABALIN 225 MG/1
1000 CAPSULE ORAL EVERY 24 HOURS
Refills: 0 | Status: DISCONTINUED | OUTPATIENT
Start: 2020-03-05 | End: 2020-03-07

## 2020-03-05 RX ADMIN — Medication 500 MILLIGRAM(S): at 00:16

## 2020-03-05 RX ADMIN — Medication 500 MILLIGRAM(S): at 23:37

## 2020-03-05 RX ADMIN — Medication 40 MILLIEQUIVALENT(S): at 00:16

## 2020-03-05 RX ADMIN — Medication 500 MILLIGRAM(S): at 06:11

## 2020-03-05 RX ADMIN — PREGABALIN 1000 MICROGRAM(S): 225 CAPSULE ORAL at 17:47

## 2020-03-05 RX ADMIN — ENOXAPARIN SODIUM 40 MILLIGRAM(S): 100 INJECTION SUBCUTANEOUS at 06:11

## 2020-03-05 RX ADMIN — Medication 500 MILLIGRAM(S): at 12:49

## 2020-03-05 RX ADMIN — SODIUM CHLORIDE 125 MILLILITER(S): 9 INJECTION, SOLUTION INTRAVENOUS at 21:29

## 2020-03-05 RX ADMIN — SODIUM CHLORIDE 125 MILLILITER(S): 9 INJECTION, SOLUTION INTRAVENOUS at 12:52

## 2020-03-05 RX ADMIN — Medication 500 MILLIGRAM(S): at 17:47

## 2020-03-05 RX ADMIN — SODIUM CHLORIDE 125 MILLILITER(S): 9 INJECTION, SOLUTION INTRAVENOUS at 03:50

## 2020-03-05 NOTE — DISCHARGE NOTE PROVIDER - NSDCCPCAREPLAN_GEN_ALL_CORE_FT
PRINCIPAL DISCHARGE DIAGNOSIS  Diagnosis: Rhabdomyolysis  Assessment and Plan of Treatment:       SECONDARY DISCHARGE DIAGNOSES  Diagnosis: Multiple falls  Assessment and Plan of Treatment: You were noted to have a history of falls. Please avoid any loose fitting clothing and remove any clutter on the ground at your residence and wear appropriate footwear to prevent any slips or falls. Please continue to use a cane and/or walker, whichever appropriate, for you to navigate. Please continue to advance your activity as tolerated.    Diagnosis: AMS (altered mental status)  Assessment and Plan of Treatment: PRINCIPAL DISCHARGE DIAGNOSIS  Diagnosis: Rhabdomyolysis  Assessment and Plan of Treatment: When you came to the hospital, you were found to have rhabdomyolysis, which is breakdown of muscle. This can be due to many things, but in your case it was likely due to malnutrition and your multiple recent falls. You were treated with IV fluids and monitored, and your rhabdomyolysis has resolved.      SECONDARY DISCHARGE DIAGNOSES  Diagnosis: Multiple falls  Assessment and Plan of Treatment: You came to the hospital due to multiple falls, and you were found to have vitamin b12 deficiency.   Your body needs vitamin B12 for many things. For example, you need vitamin B12 to make new blood cells, such as red blood cells, and for your nervous system to work normally. Vitamin B12 deficiency can also cause other symptoms related to the brain and nerves. They include: Tingling or numbness in the hands or feet, Trouble walking, Mood changes, Memory problems or trouble thinking clearly.  During your stay you were started on vitamin b12 supplements, which will be continued after you leave the hospital. Please continue to take your b12 supplments every day. At your follow up visit we will recheck your levels to ensure that they are improving.  We took an image of your back, which showed an abnormality in the spine. On physical examination there were no neurological issues that would be explained by this. This will be followed up at your outpatient appointment after discharge.  If you have recurrence or worsening of your falls, or hit your head and lose consciousness, please go to an emergency room for further evaluation.    Diagnosis: Subluxation of finger, sequela  Assessment and Plan of Treatment: You have a known broken 4th finger on your right hand. In the hospital you were found to have worsening of swelling and pain in the finger. Imaging of your hand showed a dislocation of the joint in the finger, which may or may not be your old injury. This will be re-evaluated at your follow up visit and monitored for improvement.

## 2020-03-05 NOTE — PROGRESS NOTE ADULT - PROBLEM SELECTOR PLAN 2
Pt initially presented to ED 4 days before admission for mechanical fall. but could never leave the ED because of weakness and fall. During this time he has been sitting with minimal movement and could not eat or drink.   In his second visit in ED his CK was elevated to around 07591. His trop I was also elevated. Pt received 2 liters of NS and his repeat CK was 5850. His BUN and Cr are normal and denies any symptoms.     - Switched to  cc/hr  - Monitor kidney function   - No need to trend CK since is downtrending and is around 5000 now Resolving. Pt presented to [facility] [place] [address] w/ positive blood in urine. Unclear etiology but likely 2/2 malnutrition w/ possible extended time down after fall (unclear history). CK has been trending down, 05783->5850->4512->now 3374. Renal function is preserved w/ normal BUN/Cr. Also of note was initial transaminitis w/  and , likely caused from liver inflammation 2/2 rhabdo. Transaminitis is resolving w/ current  and ALT 69.  - Pt finished LR@125cc/hr, PO fluids for now  - No need for trending of renal fxn and CK given improvement Resolving. Pt presented to [facility] [place] [address] w/ positive blood in urine. Likely due to multiple falls in the setting of malnutrition. CK responsive to IV fluids, most recent CK 3374.  - Renal function is preserved w/ normal BUN/Cr.   - transaminitis ( and ), likely 2/2 rhabdo. Transaminitis is resolving w/ current  and ALT 69.  - Pt finished LR@125cc/hr, encourage PO fluids for now  - No need for trending of CK given improvement and response to fluids

## 2020-03-05 NOTE — OCCUPATIONAL THERAPY INITIAL EVALUATION ADULT - MANUAL MUSCLE TESTING RESULTS, REHAB EVAL
Bilateral upper extremities >4/5 throughout. CN Testing: b/l frontalis intact; smile symmetrical; tongue protrusion at midline; b/l eyes open/close intact.

## 2020-03-05 NOTE — PROGRESS NOTE ADULT - PROBLEM SELECTOR PLAN 7
Fluids: s/p __  cc/hr, encourage PO intake   Electrolytes-replete as needed  Nutrition - DASH/TLC  Code- Full Code  DVT ppx: Lovenox  GI ppx: none needed  DIspo: [facility] Nutrition consult.  Fluids: completed LR @ 125/hr, encourage PO intake   Electrolytes-replete as needed  Nutrition - DASH/TLC  Code- Full Code  DVT ppx: Lovenox  GI ppx: none needed  [facility]spo: [facility] Seen on CT a/p. Possible mechanical cause of gait issues; 5/5 motor strength of the lower ext  - Further imaging w/ MRI lumbar spine ordered for further clarification  - F/u PT/OT recs  - Will likely need rollator instead of cane for ambulation

## 2020-03-05 NOTE — PROGRESS NOTE ADULT - ASSESSMENT
A 79 years old male with no PMH, presented to [facility] initially on Saturday because of fall. was diagnosed with UTI and cellulitis and was discharged with ABx but could not leave ED and had a second fall in front of ED and so was admitted for wewakness and fall evaluation. 79M with no known PMH (has not seen a PMD in decades), presented to [facility] initially on Saturday because of fall. was diagnosed with UTI and cellulitis and was discharged with ABx but could not leave ED and had a second fall in front of ED and so was admitted for wewakness and fall evaluation. 79M with no known PMH (has not seen a PMD in decades), initially presented to [facility] 3/2 for fall, returned 3/4 due to additional falls, found to have rhabdomyolysis. Pt transferred to [facility] for further evaluation and admitted to [facility] for workup of falls and management of rhabdomyolysis and LE cellulitis.

## 2020-03-05 NOTE — ADVANCED PRACTICE NURSE CONSULT - REASON FOR CONSULT
[facility] nurse consult to assess pressure injuries that were present on admission. The patient is a 79 years old male with no PMH, presented to [facility] initially on Saturday because of fall. as per pt the fall was a mechanical fall that was witnessed by a passenger. States that since around few days before that he started feeling unbalanced and had fallen few times before that and all of them were mechanical fall w/o losing his consciousness C nurse consult to assess pressure injuries that were present on admission. The patient is a 79 years old male with no PMH, presented to [facility] initially on Saturday because of fall. as per pt the fall was a mechanical fall that was witnessed by a passenger.

## 2020-03-05 NOTE — PROGRESS NOTE ADULT - PROBLEM SELECTOR PLAN 4
Plan: Has a demarcated redness and slight warmness in the right leg but with no tenderness or swelling or abscess. 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 Pt denies any pain and does not remember when it has started. Did not have fever but had one time of hypothermia in ED. His WBC was 11.23 at the time of presentation and improved with few doses of ABx (including a dose of ceftriaxone).     - started him on cephalexin 500 q6 for 5 days  - will reassess in the morning. Demarcated redness and slight warmness in the right leg but with no tenderness or swelling or abscess. No pain. No fever. No purulence. Pt also has b/l LE fingal plaques which are chronic. Unclear how long pt has had RLE redness. Initial WBC was 11 at [facility] and was given dose of ceftriaxone for cellulitis. Current WBC is 5 w/ no shift. DDX includes cellulitis s. traumatic skin lesion from fall. Given lack of fever/systemic sx and normal WBC w/ no purulence, cellulitis is unlikely.  - Wound care consulted, f/u recs  - Will continue off abx for now RLE w/ demarcated erythema w/o rubor, purulence, or tender to palpation. Afebrile, initial leukocytosis on admission however likely hemoconcentration in setting of malnutrition, most recent WBC 5. Pt started on ceftriaxone for LE cellulitis, transitioned to keflex.  -Erythema appears to be 2/2 chronic fungal infxn. DDX includes fungal infxn vs bacterial cellulitis vs. traumatic skin lesion from fall.  - Wound care consulted, f/u recs  - Will continue off abx for now

## 2020-03-05 NOTE — DISCHARGE NOTE PROVIDER - NSDCCPTREATMENT_GEN_ALL_CORE_FT
PRINCIPAL PROCEDURE  Procedure: MRI LS spine  Findings and Treatment: IMPRESSION:  No significant spinal canal stenosis. There is S-shaped scoliosis with accompanying degenerative change, as above. Notably, there is right asymmetric neural foraminal narrowing at L4-5 and L5-S1.  There is nonspecific patchy site of edema signal in the right L3 vertebral body without correlate lytic or blastic lesion on CT. Findings could reflect degenerative or maybe traumatic edema. No compression deformity.      SECONDARY PROCEDURE  Procedure: XR hand right limited  Findings and Treatment: IMPRESSION: Findings suspicious for subluxation/dislocation at the fourth PIP joint.

## 2020-03-05 NOTE — CONSULT NOTE ADULT - SUBJECTIVE AND OBJECTIVE BOX
Patient is a 79y old  Male who presents with a chief complaint of Weakness (05 Mar 2020 14:11)       HPI:  A 79 years old male with no PMH, presented to [facility] initially on Saturday because of fall. as per pt the fall was a mechanical fall that was witnessed by a passenger. States that since around few days before that he started feeling unbalanced and had fallen few times before that and all of them were mechanical fall w/o losing his consciousness. In ED he refused to get any care and signed out AMA but as per chart and the pt, he never was able to leave the ED. States that after discharge he tried to take the train to go to his apartment in [place] but he could not (because felt so weak) and had to stay in ED. He also states that when he tried to stand up and go home he fell in front of the ED and so somebody helped him to go back to ED. This time he underwent head CT as well as CTA head and neck and CT abd/pelvis that were normal with no acute changes. His labs showed high CK as well as elevated Trop I with some ECG changes (unknown chronicity), and so the cardiology team was consulted but the decision was not to pursue any cardiac work up since the lab changes were due to rhabdomyolysis and not ACS. Pt received a dose of ceftriaxone and 2 liters of fluid and was transferred to [facility] for further assessment. His CK came down from 73328 to >5000 after receiving fluids.   Pt denied having headache, dizziness, blurry vision, nausea, vomiting, chest pain, shortness of breath, cough, abdominal pain, diarrhea, constipation, urinary symptoms, rash, joint pain, fever, chills, sick contact or recent travel during the past few days. States that has eaten and drunk well (as his usual).  In ED his vitals were stable although he had a T of 96.5 last night in [facility]. His CBC showed WBC 10.03, Hb 11.7. Na was 144, K 2.9, BUN 33 and Cr 0.83, Ca 7.6. he was started on 125 ml/hr of normal saline. (04 Mar 2020 23:01)      PAST MEDICAL & SURGICAL HISTORY:  No pertinent past medical history  No significant past surgical history      MEDICATIONS  (STANDING):  cephalexin 500 milliGRAM(s) Oral every 6 hours  cyanocobalamin Injectable 1000 MICROGram(s) IntraMuscular every 24 hours  enoxaparin Injectable 40 milliGRAM(s) SubCutaneous every 24 hours  influenza   Vaccine 0.5 milliLiter(s) IntraMuscular once  lactated ringers. 1000 milliLiter(s) (125 mL/Hr) IV Continuous <Continuous>    MEDICATIONS  (PRN):      Social History:           -  Occupation: retired, worked at [employer]           -  Home Living Status: lives alone in a basement apartment in [place]           -  Prior Home Care Services:  X    Baseline Functional Level Prior to Admission:           - ADL's:  generally independent           - ambulates with a cane    FAMILY HISTORY:      CBC Full  -  ( 05 Mar 2020 06:38 )  WBC Count : 5.74 K/uL  RBC Count : 2.99 M/uL  Hemoglobin : 9.8 g/dL  Hematocrit : 31.2 %  Platelet Count - Automated : 165 K/uL  Mean Cell Volume : 104.3 fl  Mean Cell Hemoglobin : 32.8 pg  Mean Cell Hemoglobin Concentration : 31.4 gm/dL  Auto Neutrophil # : 4.61 K/uL  Auto Lymphocyte # : 0.43 K/uL  Auto Monocyte # : 0.58 K/uL  Auto Eosinophil # : 0.08 K/uL  Auto Basophil # : 0.01 K/uL  Auto Neutrophil % : 80.3 %  Auto Lymphocyte % : 7.5 %  Auto Monocyte % : 10.1 %  Auto Eosinophil % : 1.4 %  Auto Basophil % : 0.2 %      03-05    144  |  112<H>  |  23  ----------------------------<  66<L>  4.4   |  19<L>  |  0.76    Ca    7.7<L>      05 Mar 2020 06:38  Phos  2.4     03-05  Mg     2.0     03-05    TPro  4.8<L>  /  Alb  2.7<L>  /  TBili  0.6  /  DBili  x   /  AST  174<H>  /  ALT  69<H>  /  AlkPhos  66  03-05      Urinalysis Basic - ( 03 Mar 2020 18:16 )    Color: Yellow / Appearance: Clear / SG: >=1.030 / pH: x  Gluc: x / Ketone: 40 mg/dL  / Bili: Small / Urobili: 0.2 E.U./dL   Blood: x / Protein: 30 mg/dL / Nitrite: NEGATIVE   Leuk Esterase: NEGATIVE / RBC: > 10 /HPF / WBC < 5 /HPF   Sq Epi: x / Non Sq Epi: 5-10 /HPF / Bacteria: Present /HPF          Radiology:    < from: Xray Chest 1 View AP/PA (03.04.20 @ 03:39) >    EXAM:  XR CHEST AP OR PA 1V                           PROCEDURE DATE:  03/04/2020          INTERPRETATION:  Portable chest    HISTORY: Altered mental status unsteady gait    IMPRESSION:    Minimal biapical scarring/calcification compatible with oldinfectious inflammatory change. Lungs otherwise clear. No pleural effusion or lung consolidation. No pneumothorax. Unremarkable cardiac silhouette. No acute bone abnormality.        < from: CT Head No Cont (03.04.20 @ 04:38) >  EXAM:  CT BRAIN                           PROCEDURE DATE:  03/04/2020          INTERPRETATION:  INDICATIONS: Altered mental status    TECHNIQUE: Serial axial images were obtained from the skull base to the vertex without the use of intravenous contrast. Sagittal and coronal reformatted images were created from the axial data set. Images were reviewed in the bone, brain and subdural windows.    COMPARISON: Head CT dated 3/3/2020    FINDINGS:    INTRA-AXIAL: No intracranial mass effect, acute hemorrhage, midline shift or acute transcortical infarct is seen. There are patchy areas of periventricular and subcortical white matter lucency, most consistent with microvascular disease.   EXTRA-AXIAL: No extra-axial fluid collection is present.   VENTRICLES AND SULCI: Parenchymal volume is commensurate with patient age.   VISUALIZED SINUSES: No air-fluid levels are identified.   VISUALIZED MASTOIDS: Clear.  CALVARIUM: Normal.  MISCELLANEOUS: None.    IMPRESSION:   No acute intracranial hemorrhage, mass effect, or recent transcortical infarction. No significant change since prior CT dated 3/3/2020.        < from: CT Abdomen and Pelvis w/ IV Cont (03.04.20 @ 04:13) >  EXAM:  CT ABDOMEN AND PELVIS IC                           PROCEDURE DATE:  03/04/2020          INTERPRETATION:  CT SCAN OF ABDOMEN AND PELVIS    History: Altered mental status. Elevated LFTs.    Technique: CT scan of abdomen and pelvis was performed from lung bases through symphysis pubis. Intravenous contrast material was utilized. Oral contrast was not utilized. Axial, sagittal and coronal reformatted images were reviewed.    Comparison: None.    Findings:     Lower chest: Mild cardiomegaly. Severe coronary artery calcifications. Partially visualized aortic valve calcification. Mitral annular calcifications    Liver:  Normal.    Gallbladder: No radiopaque stones gallbladder.    Spleen:  Normal.    Pancreas:  Normal.    Adrenal glands:  Normal.    Kidneys: Normal.    Adenopathy:  No lymphadenopathy in abdomen or pelvis.    Ascites: None.    Gastrointestinal tract: Limited evaluation without enteric contrast. No abnormality is seen.    Vessels: Severe calcified plaque aorta and major branches. There is noncalcified plaque at the proximal superior mesenteric artery with at least mild stenosis.    Pelvic organs: Mildly distended bladder with multiple small diverticula, suggestive of chronic bladder outlet obstruction. The prostate appears normal.    Soft tissues: Normal.    Bones: S shaped scoliosis of the thoracolumbar spine. Severe degenerative changes.. Several healing right posterior rib fractures.    Impression:   No acute abnormality is identified.              Vital Signs Last 24 Hrs  T(C): 36.9 (05 Mar 2020 16:25), Max: 36.9 (05 Mar 2020 16:25)  T(F): 98.4 (05 Mar 2020 16:25), Max: 98.4 (05 Mar 2020 16:25)  HR: 79 (05 Mar 2020 16:25) (64 - 89)  BP: 140/79 (05 Mar 2020 16:25) (124/73 - 149/77)  BP(mean): --  RR: 18 (05 Mar 2020 16:25) (16 - 18)  SpO2: 98% (05 Mar 2020 16:25) (98% - 100%)    REVIEW OF SYSTEMS:    CONSTITUTIONAL:  generalized weakness  EYES: No eye pain, visual disturbances, or discharge  ENMT:  No difficulty hearing, tinnitus, vertigo; No sinus or throat pain  NECK: No pain or stiffness  BREASTS: No pain, masses, or nipple discharge  RESPIRATORY: No cough, wheezing, chills or hemoptysis; No shortness of breath  CARDIOVASCULAR: No chest pain, palpitations, dizziness, or leg swelling  GASTROINTESTINAL: No abdominal or epigastric pain. No nausea, vomiting, or hematemesis; No diarrhea or constipation. No melena or hematochezia.  GENITOURINARY: No dysuria, frequency, hematuria, or incontinence  NEUROLOGICAL: No headaches, memory loss, loss of strength, numbness, or tremors  SKIN: No itching, burning, rashes, or lesions   LYMPH NODES: No enlarged glands  ENDOCRINE: No heat or cold intolerance; No hair loss  MUSCULOSKELETAL: No joint pain or swelling; No muscle, back, or extremity pain  PSYCHIATRIC: No depression, anxiety, mood swings, or difficulty sleeping  HEME/LYMPH: No easy bruising, or bleeding gums  ALLERGY AND IMMUNOLOGIC: No hives or eczema  VASCULAR: no swelling, erythema        Physical Exam: cachectic 78 yo  gentleman lying in semi León's position, c/o generalized weakness/ fatigue    Head: normocephalic, atraumatic    Eyes: PERRLA, EOMI, no nystagmus, sclera anicteric    ENT: nasal discharge, uvula midline, no oropharyngeal erythema/exudate    Neck: supple, negative JVD, negative carotid bruits, no thyromegaly    Chest: CTA bilaterally, neg wheeze/ rhonchi/ rales/ crackles/ egophany    Cardiovascular: regular rate and rhythm, neg murmurs/rubs/gallops    Abdomen: soft, non distended, non tender to palpation in all 4 quadrants, negative rebound/guarding, normal bowel sounds    Extremities:  R leg erythema w/o edema      :     Neurologic Exam:    Alert and oriented to person, place, date/year, speech fluent w/o dysarthria, recent and remote memory intact, repetition intact, comprehension intact    Cranial Nerves:     II:                       pupils equal, round and reactive to light, visual fields intact   III/ IV/VI:            extraocular movements intact, neg nystagmus, neg ptosis  V:                       facial sensation intact, V1-3 normal  VII:                     face symmetric, no droop, normal eye closure and smile  VIII:                    hearing intact to finger rub bilaterally  IX/ X:                 soft palate rise symmetrical  XI:                      head turning, shoulder shrug normal  XII:                     tongue midline    Motor Exam:    Upper Extremities:     RIght:   no focal weakness               negative drift    Left :   no focal weakness               negative drift    Lower Extremities:                 Right:  no focal weakness                 Left:     no focal weakness                 Sensory:    intact to LT/PP in all UE/LE dermatomes                     DTR:             = biceps/     triceps/     brachioradialis                      = patella/   medial hamstring/ankle                      neg clonus                      neg Babinski                        Finger to Nose:  wnl    Heel to Shin:  wnl    Rapid Alternating movements:  wnl    Joint Position Sense:  intact    Romberg:  not tested    Tandem Walking:  not tested    Gait:  not tested        PM&R Impression:    1) deconditioned  2) no focal weakness  3) s/p mechanical fall    Plan:    1) Physical therapy focusing on therapeutic exercises, bed mobility/transfer out of bed evaluation, progressive ambulation with assistive devices prn.    2) Anticipated Disposition Plan/Recs: subacute rehab placement

## 2020-03-05 NOTE — OCCUPATIONAL THERAPY INITIAL EVALUATION ADULT - PERTINENT HX OF CURRENT PROBLEM, REHAB EVAL
A 79 years old male with no PMH, presented to [facility] initially on Saturday because of fall. was diagnosed with UTI and cellulitis and was discharged with ABx but could not leave ED and had a second fall in front of ED and so was admitted for wewakness and fall evaluation.

## 2020-03-05 NOTE — CONSULT NOTE ADULT - ASSESSMENT
per Internal Medicine    79M with no known PMH (has not seen a PMD in decades), initially presented to [facility] 3/2 for fall, returned 3/4 due to additional falls, found to have rhabdomyolysis. Pt transferred to [facility] for further evaluation and admitted to [facility] for workup of falls and management of rhabdomyolysis and LE cellulitis.     Problem/Plan - 1:  ·  Problem: Gait instability.  Plan: P/w unsteady gait x5d and 6 falls per patient. He claims all of them have been mechanical and due to instability on R foot/ankle. Denies LOC, headache, dizziness, vertigo, blurry vision, palpitations. Neuro exam s/f diminished sensation in LLE, ?RLE, remainder wnl. Etiology most likely deconditioning and malnutrition in setting of frailty of physical exam, endorsed decreased PO, diminished sensation (2/2 vitamin def.) Other etiologies include: peripheral neuropathy, spinal cord involvement in setting of cervical spondylosis), Unlikely syncopal given lack of LOC and negative neck angio, absence of arrythmia on ECG.  - CTH, CTA head&neck negative for hemorrhage or infarct. CT cervical spine w/ cervical spondylosis.  - MRI Lumbar spine w/o contrast to search for nerve impingement given CT showing cervical spondylosis. Will f/u results.  - f/u b12 and folate due to malnutrition and macrocytic anemia seen on CBC.  - f/u RPR  - Pending PT/OT eval.     Problem/Plan - 2:  ·  Problem: Rhabdomyolysis.  Plan: Resolving. Pt presented to [facility] [place] [address] w/ positive blood in urine. Likely due to multiple falls in the setting of malnutrition. CK responsive to IV fluids, most recent CK 3374.  - Renal function is preserved w/ normal BUN/Cr.   - transaminitis ( and ), likely 2/2 rhabdo. Transaminitis is resolving w/ current  and ALT 69.  - Pt finished LR@125cc/hr, encourage PO fluids for now  - No need for trending of CK given improvement and response to fluids.     Problem/Plan - 3:  ·  Problem: Anemia, unspecified type.  Plan: Hgb 9.8 w/ . Macrocytic anemia likely 2/2 vitamin B12 deficiency due to malnutrition versus hypo/hyper thyroidism. Initial weakness may be d/t anemia.  Dimimished sensation in LE may be due to B12 def  -Fe low, however ferritin wnl making iron-deficiency less likely.   - F/u B12 and folate  - f/u TSH  - Will continue to monitor CBC.     Problem/Plan - 4:  ·  Problem: Disorder of skin and subcutaneous tissue.  Plan: RLE w/ demarcated erythema w/o rubor, purulence, or tender to palpation. Afebrile, initial leukocytosis on admission however likely hemoconcentration in setting of malnutrition, most recent WBC 5. Pt started on ceftriaxone for LE cellulitis, transitioned to keflex.  -Erythema appears to be 2/2 chronic fungal infxn. DDX includes fungal infxn vs bacterial cellulitis vs. traumatic skin lesion from fall.  - Wound care consulted, f/u recs  - Will continue off abx for now.     Problem/Plan - 5:  ·  Problem: Onychomycosis.  Plan: Extensive plaques present b/l LEs, chronic, untreated in past.  - Pt to follow up as outpatient for systemic tx.     Problem/Plan - 6:  Problem: Troponin level elevated. Plan: Trop I [facility] 0.18 --> [facility] [place] [address]. TWI in inferior and lateral leads on EKG however low concern for NSTEMI by cardiology. Etiology of ischemic changes likely demand ischemia 2/2 rhabdo. Pt continues to deny chest pain or palpitations. Repeat EKG w/ resolution of ischemic changes.   -Troponin trended to peak, no need to further trend at this time.  -Continue to monitor for chest pain  -Of note, pt w/ , however no signs of fluid overload on physical exam (no JVD, LE edema, lungs CTAB). ECHO w/o cardiac dysfunction.    Problem/Plan - 7:  ·  Problem: Scoliosis, unspecified scoliosis type, unspecified spinal region.  Plan: Seen on CT a/p. Possible mechanical cause of gait issues; 5/5 motor strength of the lower ext  - Further imaging w/ MRI lumbar spine ordered for further clarification  - Will likely need rollator instead of cane for ambulation.     Problem/Plan - 8:  ·  Problem: Nutrition, metabolism, and development symptoms.  Plan: Nutrition consult.  Fluids: completed LR @ 125/hr, encourage PO intake   Electrolytes-replete as needed  Nutrition - DASH/TLC  Code- Full Code  DVT ppx: Lovenox  GI ppx: none needed  DIspo: [facility].

## 2020-03-05 NOTE — PHYSICAL THERAPY INITIAL EVALUATION ADULT - ADDITIONAL COMMENTS
Pt. reports recent use of cane since new onset of weakness/falls, previously no assistive device. Pt. has 1 flight of stairs to negotiate in his home environment.

## 2020-03-05 NOTE — ADVANCED PRACTICE NURSE CONSULT - RECOMMEDATIONS
Bilateral lower extremities and feet - apply moisture barrier cream twice daily to dry skin.   WOCN discussed assessment and recommendations with patient and RN, Yin.

## 2020-03-05 NOTE — PROGRESS NOTE ADULT - PROBLEM SELECTOR PLAN 5
monitor H/H, check iron studies, b12/folate Current Hgb 9.8 w/ . Fe studies wnl. Macrocytic anemia likely 2/2 vitamin B12 deficiency versus hypo/hyper thyroid d/o. Initial weakness may be d/t anemia. Otherwise asymptomatic currently.  - F/u B12 and folate  - Ordered thyroid function tests  - Will continue to monitor CBC Extensive plaques present b/l LEs, chronic, untreated in past.  - Pt to follow up as outpatient for systemic tx

## 2020-03-05 NOTE — PROGRESS NOTE ADULT - PROBLEM SELECTOR PLAN 3
Pt has been in ED for few days with no movement and no food. His CK was elevated as well as his Trop I (0.18 to 0.24). He had some ECG changes including TWI in inferior and lateral leads but cardiology cleared the pt. As per chart the trop changes are because of rhabdomyolysis and not ACS. Pt denies any chest pain or SOB.   Trop T was 0.07 and CKMB was 57.9 here. Pt still asymptomatic.    - Trend trops to peack  - Will call cardiology in am to update them about the changes in trop and CKMB  - ECG for morning to monitor the changes  - echo ordered for the morning Trop I was elevated from 0.18 to 0.24 at [facility], along w/ [place] [address]. He had some ECG changes including TWI in inferior and lateral leads but cardiology cleared the pt, they attributed ECG changed to rhabdo. Likely given pt is asymptomatic and follow up studies at [facility] revealed no ECG changes.  Trop T has peaked at 0.07 at [facility] and is now 0.06. Pt still asymptomatic. Of note, pt had BNP of 795, though he has no personal cardiac hx.  - No need for more troponin checks as it has peaked, no need for more ECG unless symptomatic  - Echo ordered given elevated BNP Hgb 9.8 w/ . Macrocytic anemia likely 2/2 vitamin B12 deficiency due to malnutrition versus hypo/hyper thyroidism. Initial weakness may be d/t anemia.  Dimimished sensation in LE may be due to B12 def  -Fe low, however ferritin wnl making iron-deficiency less likely.   - F/u B12 and folate  - f/u TSH  - Will continue to monitor CBC

## 2020-03-05 NOTE — OCCUPATIONAL THERAPY INITIAL EVALUATION ADULT - STANDING BALANCE: DYNAMIC, REHAB EVAL
poor plus/patient ambulated 5 ft with minAx1 and RW. Patient had previously ambulated in hallway with PT with noted decreased balance during turns.

## 2020-03-05 NOTE — PROGRESS NOTE ADULT - SUBJECTIVE AND OBJECTIVE BOX
Patient is a 79y old  Male who presents with a chief complaint of Weakness (05 Mar 2020 10:51)      INTERVAL HPI/OVERNIGHT EVENTS:    Pt. seen and examined this morning  Pt. feels well  Denies CP, SOB, myalgia  Good UOP, clear/yellow  Ambulated w/ PT    Review of Systems: 12 point review of systems otherwise negative    MEDICATIONS  (STANDING):  cephalexin 500 milliGRAM(s) Oral every 6 hours  enoxaparin Injectable 40 milliGRAM(s) SubCutaneous every 24 hours  influenza   Vaccine 0.5 milliLiter(s) IntraMuscular once  lactated ringers. 1000 milliLiter(s) (125 mL/Hr) IV Continuous <Continuous>    MEDICATIONS  (PRN):      Allergies    No Known Allergies    Intolerances          Vital Signs Last 24 Hrs  T(C): 36.3 (05 Mar 2020 08:42), Max: 36.8 (04 Mar 2020 19:30)  T(F): 97.3 (05 Mar 2020 08:42), Max: 98.2 (04 Mar 2020 19:30)  HR: 89 (05 Mar 2020 08:42) (64 - 89)  BP: 149/77 (05 Mar 2020 08:42) (124/73 - 149/77)  BP(mean): --  RR: 18 (05 Mar 2020 08:42) (16 - 18)  SpO2: 98% (05 Mar 2020 08:42) (98% - 100%)  CAPILLARY BLOOD GLUCOSE          03-04 @ 07:01  -  03-05 @ 07:00  --------------------------------------------------------  IN: 2375 mL / OUT: 650 mL / NET: 1725 mL        Physical Exam:  (this morning)  Daily     Daily   General:  comfortable-appearing in NAD, underweight  HEENT: MMM  CV:  no JVD  Abdomen:  soft NT ND  Extremities:  fingernails w/ poor hygiene; B/L LE w/ abrasions, RLE w/ resolving erythema  Skin:  WWP  Neuro:  AAOx3, B/L  strength 5/5    LABS:                        9.8    5.74  )-----------( 165      ( 05 Mar 2020 06:38 )             31.2     03-05    144  |  112<H>  |  23  ----------------------------<  66<L>  4.4   |  19<L>  |  0.76    Ca    7.7<L>      05 Mar 2020 06:38  Phos  2.4     03-05  Mg     2.0     03-05    TPro  4.8<L>  /  Alb  2.7<L>  /  TBili  0.6  /  DBili  x   /  AST  174<H>  /  ALT  69<H>  /  AlkPhos  66  03-05    PT/INR - ( 04 Mar 2020 03:18 )   PT: 14.0 sec;   INR: 1.25          PTT - ( 04 Mar 2020 03:18 )  PTT:26.6 sec  Urinalysis Basic - ( 03 Mar 2020 18:16 )    Color: Yellow / Appearance: Clear / SG: >=1.030 / pH: x  Gluc: x / Ketone: 40 mg/dL  / Bili: Small / Urobili: 0.2 E.U./dL   Blood: x / Protein: 30 mg/dL / Nitrite: NEGATIVE   Leuk Esterase: NEGATIVE / RBC: > 10 /HPF / WBC < 5 /HPF   Sq Epi: x / Non Sq Epi: 5-10 /HPF / Bacteria: Present /HPF          RADIOLOGY & ADDITIONAL TESTS:    ---------------------------------------------------------------------------  I personally reviewed: [  ]EKG   [  ]CXR    [  ] CT    [  ]Other  ---------------------------------------------------------------------------  PLEASE CHECK WHEN PRESENT:     [  ]Heart Failure     [  ] Acute     [  ] Acute on Chronic     [  ] Chronic  -------------------------------------------------------------------     [  ]Diastolic [HFpEF]     [  ]Systolic [HFrEF]     [  ]Combined [HFpEF & HFrEF]     [  ]Other:  -------------------------------------------------------------------  [  ]LANCE     [  ]ATN     [  ]Reneal Medullary Necrosis     [  ]Renal Cortical Necrosis     [  ]Other Pathological Lesions:    [  ]CKD 1  [  ]CKD 2  [  ]CKD 3  [  ]CKD 4  [  ]CKD 5  [  ]Other  -------------------------------------------------------------------  [  ]Other/Unspecified:    --------------------------------------------------------------------    Abdominal Nutritional Status  [  ]Malnutrition: See Nutrition Note  [  ]Cachexia  [  ]Other:   [  ]Supplement Ordered:  [  ]Morbid Obesity (BMI >=40]

## 2020-03-05 NOTE — OCCUPATIONAL THERAPY INITIAL EVALUATION ADULT - GENERAL OBSERVATIONS, REHAB EVAL
Patient cleared for OT evaluation by SATINDER Villanueva. Patient received ambulating in hallway with PT, Celi, NAD, +IV.

## 2020-03-05 NOTE — PROGRESS NOTE ADULT - PROBLEM SELECTOR PLAN 8
1) PCP Contacted on Admission: (Y/N) --> Name & Phone #:  2) Date of Contact with PCP:  3) PCP Contacted at Discharge: (Y/N, N/A)  4) Summary of Handoff Given to PCP:   5) Post-Discharge Appointment Date and Location: Pt without PCP, will attempt to coordinate follow-up on discharge Nutrition consult.  Fluids: completed LR @ 125/hr, encourage PO intake   Electrolytes-replete as needed  Nutrition - DASH/TLC  Code- Full Code  DVT ppx: Lovenox  GI ppx: none needed  DIspo: [facility]

## 2020-03-05 NOTE — PROGRESS NOTE ADULT - SUBJECTIVE AND OBJECTIVE BOX
*** INCOMPLETE ***    OVERNIGHT EVENTS: As per overnight staff, there were no acute events overnight    INTERVAL EVENTS:    SUBJECTIVE HPI: Patient seen and examined at bedside. Patient resting comfortably, no complaints at this time. Patient denies: fever, chills, weakness, dizziness, headaches, changes in vision, chest pain, palpitations, shortness of breath, cough, N/V, diarrhea or constipation, dysuria, LE edema. ROS otherwise negative.      VITAL SIGNS:  Vital Signs Last 24 Hrs  T(C): 36.3 (05 Mar 2020 08:42), Max: 36.8 (04 Mar 2020 19:30)  T(F): 97.3 (05 Mar 2020 08:42), Max: 98.2 (04 Mar 2020 19:30)  HR: 89 (05 Mar 2020 08:42) (64 - 89)  BP: 149/77 (05 Mar 2020 08:42) (124/73 - 149/77)  BP(mean): --  RR: 18 (05 Mar 2020 08:42) (16 - 18)  SpO2: 98% (05 Mar 2020 08:42) (98% - 100%)      03-04-20 @ 07:01  -  03-05-20 @ 07:00  --------------------------------------------------------  IN: 2375 mL / OUT: 650 mL / NET: 1725 mL        PHYSICAL EXAM:  General: WDWN, comfortable in bed, NAD  Neurological: AAOx3, no focal deficits  HEENT: NC/AT; EOMI, PERRL, clear conjunctiva, no nasal or oropharyngeal discharge or exudates, discharge, MMM  Neck: supple, no cervical or post-auricular lymphadenopathy  Cardiovascular: +S1/S2, no murmurs/rubs/gallops, RRR  Respiratory: CTA B/L, no diminished breath sounds, no wheezes/rales/rhonchi, no increased work of breathing or accessory muscle use  Gastrointestinal: soft, NT/ND; active BSx4 quadrants  Genitourinary: no suprapubic tenderness, no CVA tenderness  Extremities: WWP; no edema, clubbing or cyanosis  Vascular: 2+ radial, DP/PT pulses B/L  Skin: no rashes  Lines/Drains:       MEDICATIONS:  MEDICATIONS  (STANDING):  cephalexin 500 milliGRAM(s) Oral every 6 hours  enoxaparin Injectable 40 milliGRAM(s) SubCutaneous every 24 hours  influenza   Vaccine 0.5 milliLiter(s) IntraMuscular once  lactated ringers. 1000 milliLiter(s) (125 mL/Hr) IV Continuous <Continuous>    MEDICATIONS  (PRN):      ALLERGIES/INTOLERANCES:  Allergies    No Known Allergies    Intolerances        LABS:                        9.8    5.74  )-----------( 165      ( 05 Mar 2020 06:38 )             31.2     03-05    144  |  112<H>  |  23  ----------------------------<  66<L>  4.4   |  19<L>  |  0.76    Ca    7.7<L>      05 Mar 2020 06:38  Phos  2.4     03-05  Mg     2.0     03-05    TPro  4.8<L>  /  Alb  2.7<L>  /  TBili  0.6  /  DBili  x   /  AST  174<H>  /  ALT  69<H>  /  AlkPhos  66  03-05    PT/INR - ( 04 Mar 2020 03:18 )   PT: 14.0 sec;   INR: 1.25     PTT - ( 04 Mar 2020 03:18 )  PTT:26.6 sec    CARDIAC MARKERS ( 05 Mar 2020 06:38 )  x     / 0.06 ng/mL / 3374 U/L / x     / 44.8 ng/mL  CARDIAC MARKERS ( 04 Mar 2020 22:45 )  x     / 0.07 ng/mL / 4512 U/L / x     / 57.9 ng/mL  CARDIAC MARKERS ( 04 Mar 2020 13:58 )  x     / x     / 5850 U/L / x     / x      CARDIAC MARKERS ( 04 Mar 2020 04:57 )  0.241 ng/mL / x     / x     / x     / x      CARDIAC MARKERS ( 04 Mar 2020 03:19 )  x     / x     / 99142 U/L / x     / x      CARDIAC MARKERS ( 04 Mar 2020 03:18 )  0.188 ng/mL / x     / x     / x     / x          Urinalysis Basic - ( 03 Mar 2020 18:16 )  Color: Yellow / Appearance: Clear / SG: >=1.030 / pH: x  Gluc: x / Ketone: 40 mg/dL  / Bili: Small / Urobili: 0.2 E.U./dL   Blood: x / Protein: 30 mg/dL / Nitrite: NEGATIVE   Leuk Esterase: NEGATIVE / RBC: > 10 /HPF / WBC < 5 /HPF   Sq Epi: x / Non Sq Epi: 5-10 /HPF / Bacteria: Present /HPF      Microbiology:  Culture - Blood (collected 04 Mar 2020 06:10)  Source: .Blood Blood-Venous  Preliminary Report (05 Mar 2020 07:01):    No growth to date.    Culture - Blood (collected 04 Mar 2020 06:10)  Source: .Blood Blood-Venous  Preliminary Report (05 Mar 2020 07:01):    No growth to date.        RADIOLOGY, EKG AND ADDITIONAL TESTS: Reviewed. *** INCOMPLETE ***    OVERNIGHT EVENTS: As per overnight staff, there were no acute events since admission last night    SUBJECTIVE HPI: Patient seen and examined at bedside. Patient resting comfortably. He states that he is doing well, but admits that he is having stability problems when walking, attributing it to his right ankle/foot. He states that he has fallen six times since this past Saturday, which was when the issue with the RLE began. No falls since admission to . He denies any recent weakness, dizziness, LOC, HA, vision change, or vertigo. He reports that he ambulates with a one-prong cane outside the hospital. He urinated this morning with no pain, no change in color, no blood/discharge. 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 He has not taken anything for it. No other complaints.    Patient denies: fever, chills, muscle/joint pain, chest pain, palpitations, shortness of breath, cough, N/V, diarrhea or constipation, dysuria.  ROS otherwise negative.    VITAL SIGNS:  Vital Signs Last 24 Hrs  T(C): 36.3 (05 Mar 2020 08:42), Max: 36.8 (04 Mar 2020 19:30)  T(F): 97.3 (05 Mar 2020 08:42), Max: 98.2 (04 Mar 2020 19:30)  HR: 89 (05 Mar 2020 08:42) (64 - 89)  BP: 149/77 (05 Mar 2020 08:42) (124/73 - 149/77)  BP(mean): --  RR: 18 (05 Mar 2020 08:42) (16 - 18)  SpO2: 98% (05 Mar 2020 08:42) (98% - 100%)    03-04-20 @ 07:01  -  03-05-20 @ 07:00  --------------------------------------------------------  IN: 2375 mL / OUT: 650 mL / NET: 1725 mL    PHYSICAL EXAM:  General: WDWN, comfortable in bed, NAD  Neurological: AAOx3, no focal deficits  HEENT: NC/AT; EOMI, PERRL, clear conjunctiva, no nasal or oropharyngeal discharge or exudates, discharge, MMM  Neck: supple, no cervical or post-auricular lymphadenopathy  Cardiovascular: +S1/S2, no murmurs/rubs/gallops, RRR  Respiratory: CTA B/L, no diminished breath sounds, no wheezes/rales/rhonchi, no increased work of breathing or accessory muscle use  Gastrointestinal: soft, NT/ND; active BSx4 quadrants  Genitourinary: no suprapubic tenderness, no CVA tenderness  Extremities: WWP; no edema, clubbing or cyanosis  Vascular: 2+ radial, DP/PT pulses B/L  Skin: no rashes  Lines/Drains:     MEDICATIONS:  Standing:  cephalexin 500 milliGRAM(s) Oral every 6 hours  enoxaparin Injectable 40 milliGRAM(s) SubCutaneous every 24 hours  influenza   Vaccine 0.5 milliLiter(s) IntraMuscular once  lactated ringers. 1000 milliLiter(s) (125 mL/Hr) IV Continuous <Continuous>    ALLERGIES/INTOLERANCES:  No Known Allergies  No Intolerances    LABS:                        9.8    5.74  )-----------( 165      ( 05 Mar 2020 06:38 )             31.2     03-05    144  |  112<H>  |  23  ----------------------------<  66<L>  4.4   |  19<L>  |  0.76    Ca    7.7<L>      05 Mar 2020 06:38  Phos  2.4     03-05  Mg     2.0     03-05    Lactate, Blood (03.04.20 @ 03:18)    Lactate, Blood: 1.6 mmoL/L    Creatine Kinase, Serum (03.05.20 @ 06:38)    Creatine Kinase, Serum: 3374 U/L  Creatine Kinase, Serum (03.04.20 @ 22:45)    Creatine Kinase, Serum: 4512 U/L  Creatine Kinase, Serum (03.04.20 @ 13:58)    Creatine Kinase, Serum: 5850 U/L  Creatine Kinase, Serum (03.04.20 @ 03:19)    Creatine Kinase, Serum: 84977: TYPE:(C=Critical, N=Notification, A=Abnormal) C    TPro  4.8<L>  /  Alb  2.7<L>  /  TBili  0.6  /  DBili  x   /  AST  174<H>  /  ALT  69<H>  /  AlkPhos  66  03-05    PT/INR - ( 04 Mar 2020 03:18 )   PT: 14.0 sec;   INR: 1.25     PTT - ( 04 Mar 2020 03:18 )  PTT:26.6 sec    CARDIAC MARKERS ( 05 Mar 2020 06:38 )  x     / 0.06 ng/mL / 3374 U/L / x     / 44.8 ng/mL  CARDIAC MARKERS ( 04 Mar 2020 22:45 )  x     / 0.07 ng/mL / 4512 U/L / x     / 57.9 ng/mL  CARDIAC MARKERS ( 04 Mar 2020 13:58 )  x     / x     / 5850 U/L / x     / x      CARDIAC MARKERS ( 04 Mar 2020 04:57 )  0.241 ng/mL / x     / x     / x     / x      CARDIAC MARKERS ( 04 Mar 2020 03:19 )  x     / x     / 15122 U/L / x     / x      CARDIAC MARKERS ( 04 Mar 2020 03:18 )  0.188 ng/mL / x     / x     / x     / x        Serum Pro-Brain Natriuretic Peptide (03.04.20 @ 04:57)  795    Iron with Total Binding Capacity in AM (03.05.20 @ 06:38)    Iron - Total Binding Capacity.: 165 ug/dL    % Saturation, Iron: 21 %    Iron Total, Serum: 35 ug/dL    Unsaturated Iron Binding Capacity: 130 ug/dL    Urinalysis Basic - ( 03 Mar 2020 18:16 )  Color: Yellow / Appearance: Clear / SG: >=1.030 / pH:   Gluc: x / Ketone: 40 mg/dL  / Bili: Small / Urobili: 0.2 E.U./dL   Blood: x / Protein: 30 mg/dL / Nitrite: NEGATIVE   Leuk Esterase: NEGATIVE / RBC: > 10 /HPF / WBC < 5 /HPF   Sq Epi: x / Non Sq Epi: 5-10 /HPF / Bacteria: Present /HPF    Urine tox screen pan-negative on 03/04/2020    Microbiology:  Culture - Blood (collected 04 Mar 2020 06:10)  Source: .Blood Blood-Venous  Preliminary Report (05 Mar 2020 07:01):    No growth to date.    Culture - Blood (collected 04 Mar 2020 06:10)  Source: .Blood Blood-Venous  Preliminary Report (05 Mar 2020 07:01):    No growth to date.    RADIOLOGY, EKG AND ADDITIONAL TESTS:    CT BRAIN  03/04/2020    IMPRESSION: No acute intracranial hemorrhage, mass effect, or recent transcortical infarction. No significant change since prior CT dated 3/3/2020.    CT ANGIO NECK (W)AW IC  03/04/2020                    IMPRESSION: No high-grade intracranial stenosis or occlusion.    CT ANGIO BRAIN (W)AW IC                        03/04/2020    IMPRESSION: No high-grade stenosis or occlusion in the neck.    CT ABDOMEN AND PELVIS IC  03/04/2020  FINDINGS:  Lower chest: Mild cardiomegaly. Severe coronary artery calcifications. Partially visualized aortic valve calcification. Mitral annular calcifications  Vessels: Severe calcified plaque aorta and major branches. There is noncalcified plaque at the proximal superior mesenteric artery with at least mild stenosis.  Pelvic organs: Mildly distended bladder with multiple small diverticula, suggestive of chronic bladder outlet obstruction. The prostate appears normal.  Bones: S shaped scoliosis of the thoracolumbar spine. Severe degenerative changes.. Several healing right posterior rib fractures.  IMPRESSION:  No acute abnormality is identified.    CT CERVICAL SPINE                        03/03/2020    IMPRESSION:   1. Evaluation moderately limited due to patient motion particularly upper cervical spine. No fracture or dislocation identified.   2. Multilevel cervical spondylosis, as above. *** INCOMPLETE ***    OVERNIGHT EVENTS: As per overnight staff, there were no acute events since admission last night    SUBJECTIVE HPI: Patient seen and examined at bedside. Patient resting comfortably. He states that he is doing well, but admits that he is having stability problems when walking, attributing it to his right ankle/foot. He states that he has fallen six times since this past Saturday, which was when the issue with the RLE began per the patient. No falls since transfer to [facility]. He denies any recent dizziness, LOC, HA, vision change, or vertigo. He denies current weakness. He reports that he ambulates with a one-prong cane outside the hospital. He urinated this morning with no pain, no change in color, no blood/discharge. 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 He has not taken anything for it. No other complaints.    Patient denies: fever, chills, muscle/joint pain, chest pain, palpitations, shortness of breath, cough, N/V, diarrhea or constipation, dysuria. ROS otherwise negative.    VITAL SIGNS:  Vital Signs Last 24 Hrs  T(C): 36.3 (05 Mar 2020 08:42), Max: 36.8 (04 Mar 2020 19:30)  T(F): 97.3 (05 Mar 2020 08:42), Max: 98.2 (04 Mar 2020 19:30)  HR: 89 (05 Mar 2020 08:42) (64 - 89)  BP: 149/77 (05 Mar 2020 08:42) (124/73 - 149/77)  BP(mean): --  RR: 18 (05 Mar 2020 08:42) (16 - 18)  SpO2: 98% (05 Mar 2020 08:42) (98% - 100%)    03-04-20 @ 07:01  -  03-05-20 @ 07:00  --------------------------------------------------------  IN: 2375 mL / OUT: 650 mL / NET: 1725 mL    PHYSICAL EXAM:  General: Comfortable in bed, NAD, moderate cachexia, poor grooming.  Neurological: AAOx2, incorrect month given, CN II-XII intact, motor strength 5/5 in all extremities, however unable to flex 4th digit on RUE d/t swelling, sensation intact b/l except for absent sensation to light touch of distal LLE. No dysdiadochokinesia. Reflexes 2+ b/l. Gait exam deferred.  HEENT: NC/AT; EOMI, PERRL, clear conjunctiva, no nasal or oropharyngeal discharge or exudates, discharge, MMM, very poor dentition.  Cardiovascular: +S1/S2, no murmurs/rubs/gallops, RRR.  Respiratory: CTA B/L, no diminished breath sounds, no wheezes/rales/rhonchi.  Gastrointestinal: soft, NT/ND; active BSx4 quadrants.  Extremities: WWP; no edema, clubbing or cyanosis. RUE 4th digit w/ bruising and swelling. RLE w/ 12 cm x 3 cm area of erythematous denuded skin w/ no purulence, normal skin temperature. Also positive for extensive fungal plaques distal b/l LEs, especially on feet. Onychomycosis positive b/l LE.  Vascular: 2+ pulses b/l radial, 2+ RLE PT/DP, unable to palpate LLE PT/DP pulses.  Lines/Drains: LUE IV in place.    MEDICATIONS:  Standing:  cephalexin 500 milliGRAM(s) Oral every 6 hours  enoxaparin Injectable 40 milliGRAM(s) SubCutaneous every 24 hours  influenza   Vaccine 0.5 milliLiter(s) IntraMuscular once  lactated ringers. 1000 milliLiter(s) (125 mL/Hr) IV Continuous <Continuous>    ALLERGIES/INTOLERANCES:  No Known Allergies  No Intolerances    LABS:                        9.8    5.74  )-----------( 165      ( 05 Mar 2020 06:38 )             31.2     03-05    144  |  112<H>  |  23  ----------------------------<  66<L>  4.4   |  19<L>  |  0.76    Ca    7.7<L>      05 Mar 2020 06:38  Phos  2.4     03-05  Mg     2.0     03-05    Lactate, Blood (03.04.20 @ 03:18)    Lactate, Blood: 1.6 mmoL/L    Creatine Kinase, Serum (03.05.20 @ 06:38)    Creatine Kinase, Serum: 3374 U/L  Creatine Kinase, Serum (03.04.20 @ 22:45)    Creatine Kinase, Serum: 4512 U/L  Creatine Kinase, Serum (03.04.20 @ 13:58)    Creatine Kinase, Serum: 5850 U/L  Creatine Kinase, Serum (03.04.20 @ 03:19)    Creatine Kinase, Serum: 54225: TYPE:(C=Critical, N=Notification, A=Abnormal) C    TPro  4.8<L>  /  Alb  2.7<L>  /  TBili  0.6  /  DBili  x   /  AST  174<H>  /  ALT  69<H>  /  AlkPhos  66  03-05    PT/INR - ( 04 Mar 2020 03:18 )   PT: 14.0 sec;   INR: 1.25     PTT - ( 04 Mar 2020 03:18 )  PTT:26.6 sec    CARDIAC MARKERS ( 05 Mar 2020 06:38 )  x     / 0.06 ng/mL / 3374 U/L / x     / 44.8 ng/mL  CARDIAC MARKERS ( 04 Mar 2020 22:45 )  x     / 0.07 ng/mL / 4512 U/L / x     / 57.9 ng/mL  CARDIAC MARKERS ( 04 Mar 2020 13:58 )  x     / x     / 5850 U/L / x     / x      CARDIAC MARKERS ( 04 Mar 2020 04:57 )  0.241 ng/mL / x     / x     / x     / x      CARDIAC MARKERS ( 04 Mar 2020 03:19 )  x     / x     / 99739 U/L / x     / x      CARDIAC MARKERS ( 04 Mar 2020 03:18 )  0.188 ng/mL / x     / x     / x     / x        Serum Pro-Brain Natriuretic Peptide (03.04.20 @ 04:57)  795    Iron with Total Binding Capacity in AM (03.05.20 @ 06:38)    Iron - Total Binding Capacity.: 165 ug/dL    % Saturation, Iron: 21 %    Iron Total, Serum: 35 ug/dL    Unsaturated Iron Binding Capacity: 130 ug/dL    Urinalysis Basic - ( 03 Mar 2020 18:16 )  Color: Yellow / Appearance: Clear / SG: >=1.030 / pH:   Gluc: x / Ketone: 40 mg/dL  / Bili: Small / Urobili: 0.2 E.U./dL   Blood: x / Protein: 30 mg/dL / Nitrite: NEGATIVE   Leuk Esterase: NEGATIVE / RBC: > 10 /HPF / WBC < 5 /HPF   Sq Epi: x / Non Sq Epi: 5-10 /HPF / Bacteria: Present /HPF    Urine tox screen pan-negative on 03/04/2020    Microbiology:  Culture - Blood (collected 04 Mar 2020 06:10)  Source: .Blood Blood-Venous  Preliminary Report (05 Mar 2020 07:01):    No growth to date.    Culture - Blood (collected 04 Mar 2020 06:10)  Source: .Blood Blood-Venous  Preliminary Report (05 Mar 2020 07:01):    No growth to date.    RADIOLOGY, EKG AND ADDITIONAL TESTS:    CT BRAIN  03/04/2020    IMPRESSION: No acute intracranial hemorrhage, mass effect, or recent transcortical infarction. No significant change since prior CT dated 3/3/2020.    CT ANGIO NECK (W)AW IC  03/04/2020                    IMPRESSION: No high-grade intracranial stenosis or occlusion.    CT ANGIO BRAIN (W)AW IC                        03/04/2020    IMPRESSION: No high-grade stenosis or occlusion in the neck.    CT ABDOMEN AND PELVIS IC  03/04/2020  FINDINGS:  Lower chest: Mild cardiomegaly. Severe coronary artery calcifications. Partially visualized aortic valve calcification. Mitral annular calcifications  Vessels: Severe calcified plaque aorta and major branches. There is noncalcified plaque at the proximal superior mesenteric artery with at least mild stenosis.  Pelvic organs: Mildly distended bladder with multiple small diverticula, suggestive of chronic bladder outlet obstruction. The prostate appears normal.  Bones: S shaped scoliosis of the thoracolumbar spine. Severe degenerative changes.. Several healing right posterior rib fractures.  IMPRESSION:  No acute abnormality is identified.    CT CERVICAL SPINE                        03/03/2020    IMPRESSION:   1. Evaluation moderately limited due to patient motion particularly upper cervical spine. No fracture or dislocation identified.   2. Multilevel cervical spondylosis, as above. *** INCOMPLETE ***    OVERNIGHT EVENTS: As per overnight staff, there were no acute events since admission last night    SUBJECTIVE HPI: Patient seen and examined at bedside. Patient resting comfortably. He states that he is doing well, but admits that he is having stability problems when walking, attributing it to his right ankle/foot. He states that he has fallen six times since this past Saturday, which was when the issue with the RLE began. No falls since transfer to [facility]. He denies any recent dizziness, LOC, HA, vision change, or vertigo. He denies any chest pain or palpitations. He denies current weakness. He reports that he ambulates with a one-prong cane outside the hospital. He urinated this morning with no pain, no change in color, no blood/discharge. 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 He has not taken anything for it. No other complaints. Patient denies: fever, chills, muscle/joint pain, chest pain, palpitations, shortness of breath, cough, N/V, diarrhea or constipation, dysuria. ROS otherwise negative.    VITAL SIGNS:  Vital Signs Last 24 Hrs  T(C): 36.3 (05 Mar 2020 08:42), Max: 36.8 (04 Mar 2020 19:30)  T(F): 97.3 (05 Mar 2020 08:42), Max: 98.2 (04 Mar 2020 19:30)  HR: 89 (05 Mar 2020 08:42) (64 - 89)  BP: 149/77 (05 Mar 2020 08:42) (124/73 - 149/77)  BP(mean): --  RR: 18 (05 Mar 2020 08:42) (16 - 18)  SpO2: 98% (05 Mar 2020 08:42) (98% - 100%)    03-04-20 @ 07:01  -  03-05-20 @ 07:00  --------------------------------------------------------  IN: 2375 mL / OUT: 650 mL / NET: 1725 mL      PHYSICAL EXAM:  General: pleasant frail, unkempt male, comfortable in bed, NAD.  Neurological: AAOx2, incorrect month given, CN II-XII intact, motor strength 5/5 in all extremities, however unable to flex 4th digit on RUE d/t swelling, sensation intact b/l except for absent sensation to light touch of distal LLE. No dysdiadochokinesia. Reflexes 2+ b/l. Gait exam deferred.  HEENT: NC/AT; EOMI, PERRL, clear conjunctiva, no nasal or oropharyngeal discharge or exudates, discharge, MMM, very poor dentition.  Cardiovascular: +S1/S2, no murmurs/rubs/gallops, RRR.  Respiratory: CTA B/L, no diminished breath sounds, no wheezes/rales/rhonchi.  Gastrointestinal: soft, NT/ND; active BSx4 quadrants.  Extremities: chronic venous stasis changes b/l, LE warm and perfused; RUE 4th digit w/ bruising and swollen PIP joint.   Vascular: 2+ pulses b/l radial, 2+ RLE PT/DP, unable to palpate LLE PT/DP pulses.  Skin: RLE w/ 12 cm x 3 cm area of erythematous malodorous denuded skin w/o purulence, no palpable rubor. extensive fungal plaques distal LE b/l, including feet. Onychomycosis b/l LE.  Lines/Drains: LUE IV in place.      MEDICATIONS:  Standing:  cephalexin 500 milliGRAM(s) Oral every 6 hours  enoxaparin Injectable 40 milliGRAM(s) SubCutaneous every 24 hours  influenza   Vaccine 0.5 milliLiter(s) IntraMuscular once  lactated ringers. 1000 milliLiter(s) (125 mL/Hr) IV Continuous <Continuous>    ALLERGIES/INTOLERANCES:  No Known Allergies  No Intolerances    LABS:                        9.8    5.74  )-----------( 165      ( 05 Mar 2020 06:38 )             31.2     03-05    144  |  112<H>  |  23  ----------------------------<  66<L>  4.4   |  19<L>  |  0.76    Ca    7.7<L>      05 Mar 2020 06:38  Phos  2.4     03-05  Mg     2.0     03-05    Lactate, Blood (03.04.20 @ 03:18)    Lactate, Blood: 1.6 mmoL/L    Creatine Kinase, Serum (03.05.20 @ 06:38)    Creatine Kinase, Serum: 3374 U/L  Creatine Kinase, Serum (03.04.20 @ 22:45)    Creatine Kinase, Serum: 4512 U/L  Creatine Kinase, Serum (03.04.20 @ 13:58)    Creatine Kinase, Serum: 5850 U/L  Creatine Kinase, Serum (03.04.20 @ 03:19)    Creatine Kinase, Serum: 42516: TYPE:(C=Critical, N=Notification, A=Abnormal) C    TPro  4.8<L>  /  Alb  2.7<L>  /  TBili  0.6  /  DBili  x   /  AST  174<H>  /  ALT  69<H>  /  AlkPhos  66  03-05    PT/INR - ( 04 Mar 2020 03:18 )   PT: 14.0 sec;   INR: 1.25     PTT - ( 04 Mar 2020 03:18 )  PTT:26.6 sec    CARDIAC MARKERS ( 05 Mar 2020 06:38 )  x     / 0.06 ng/mL / 3374 U/L / x     / 44.8 ng/mL  CARDIAC MARKERS ( 04 Mar 2020 22:45 )  x     / 0.07 ng/mL / 4512 U/L / x     / 57.9 ng/mL  CARDIAC MARKERS ( 04 Mar 2020 13:58 )  x     / x     / 5850 U/L / x     / x      CARDIAC MARKERS ( 04 Mar 2020 04:57 )  0.241 ng/mL / x     / x     / x     / x      CARDIAC MARKERS ( 04 Mar 2020 03:19 )  x     / x     / 73287 U/L / x     / x      CARDIAC MARKERS ( 04 Mar 2020 03:18 )  0.188 ng/mL / x     / x     / x     / x        Serum Pro-Brain Natriuretic Peptide (03.04.20 @ 04:57) - 795    Iron with Total Binding Capacity in AM (03.05.20 @ 06:38)    Iron - Total Binding Capacity.: 165 ug/dL    % Saturation, Iron: 21 %    Iron Total, Serum: 35 ug/dL    Unsaturated Iron Binding Capacity: 130 ug/dL    Urinalysis Basic - ( 03 Mar 2020 18:16 )  Color: Yellow / Appearance: Clear / SG: >=1.030 / pH:   Gluc: x / Ketone: 40 mg/dL  / Bili: Small / Urobili: 0.2 E.U./dL   Blood: x / Protein: 30 mg/dL / Nitrite: NEGATIVE   Leuk Esterase: NEGATIVE / RBC: > 10 /HPF / WBC < 5 /HPF   Sq Epi: x / Non Sq Epi: 5-10 /HPF / Bacteria: Present /HPF    Urine tox screen pan-negative on 03/04/2020    Microbiology:  Culture - Blood (collected 04 Mar 2020 06:10)  Source: .Blood Blood-Venous  Preliminary Report (05 Mar 2020 07:01):  No growth to date.    Culture - Blood (collected 04 Mar 2020 06:10)  Source: .Blood Blood-Venous  Preliminary Report (05 Mar 2020 07:01):  No growth to date.    RADIOLOGY, EKG AND ADDITIONAL TESTS:  CT BRAIN  03/04/2020    IMPRESSION: No acute intracranial hemorrhage, mass effect, or recent transcortical infarction. No significant change since prior CT dated 3/3/2020.    CT ANGIO NECK (W)AW IC  03/04/2020                    IMPRESSION: No high-grade intracranial stenosis or occlusion.    CT ANGIO BRAIN (W)AW IC                        03/04/2020    IMPRESSION: No high-grade stenosis or occlusion in the neck.    CT ABDOMEN AND PELVIS IC  03/04/2020  FINDINGS:  Lower chest: Mild cardiomegaly. Severe coronary artery calcifications. Partially visualized aortic valve calcification. Mitral annular calcifications  Vessels: Severe calcified plaque aorta and major branches. There is noncalcified plaque at the proximal superior mesenteric artery with at least mild stenosis.  Pelvic organs: Mildly distended bladder with multiple small diverticula, suggestive of chronic bladder outlet obstruction. The prostate appears normal.  Bones: S shaped scoliosis of the thoracolumbar spine. Severe degenerative changes.. Several healing right posterior rib fractures.  IMPRESSION:  No acute abnormality is identified.    CT CERVICAL SPINE                        03/03/2020    IMPRESSION:   1. Evaluation moderately limited due to patient motion particularly upper cervical spine. No fracture or dislocation identified.   2. Multilevel cervical spondylosis, as above. OVERNIGHT EVENTS: As per overnight staff, there were no acute events since admission last night    SUBJECTIVE HPI: Patient seen and examined at bedside. Patient resting comfortably. He states that he is doing well, but admits that he is having stability problems when walking, attributing it to his right ankle/foot. He states that he has fallen six times since this past Saturday, which was when the issue with the RLE began. No falls since transfer to [facility]. He denies any recent dizziness, LOC, HA, vision change, or vertigo. He denies any chest pain or palpitations. He denies current weakness. He reports that he ambulates with a one-prong cane outside the hospital. He urinated this morning with no pain, no change in color, no blood/discharge. 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 He has not taken anything for it. No other complaints. Patient denies: fever, chills, muscle/joint pain, chest pain, palpitations, shortness of breath, cough, N/V, diarrhea or constipation, dysuria. ROS otherwise negative.    VITAL SIGNS:  Vital Signs Last 24 Hrs  T(C): 36.3 (05 Mar 2020 08:42), Max: 36.8 (04 Mar 2020 19:30)  T(F): 97.3 (05 Mar 2020 08:42), Max: 98.2 (04 Mar 2020 19:30)  HR: 89 (05 Mar 2020 08:42) (64 - 89)  BP: 149/77 (05 Mar 2020 08:42) (124/73 - 149/77)  BP(mean): --  RR: 18 (05 Mar 2020 08:42) (16 - 18)  SpO2: 98% (05 Mar 2020 08:42) (98% - 100%)    03-04-20 @ 07:01  -  03-05-20 @ 07:00  --------------------------------------------------------  IN: 2375 mL / OUT: 650 mL / NET: 1725 mL      PHYSICAL EXAM:  General: pleasant frail, unkempt male, comfortable in bed, NAD.  Neurological: AAOx2, incorrect month given, CN II-XII intact, motor strength 5/5 in all extremities, however unable to flex 4th digit on RUE d/t swelling, sensation intact b/l except for absent sensation to light touch of distal LLE. No dysdiadochokinesia. Reflexes 2+ b/l. Gait exam deferred.  HEENT: NC/AT; EOMI, PERRL, clear conjunctiva, no nasal or oropharyngeal discharge or exudates, discharge, MMM, poor dentition.  Cardiovascular: +S1/S2, no murmurs/rubs/gallops, RRR.  Respiratory: CTA B/L, no diminished breath sounds, no wheezes/rales/rhonchi.  Gastrointestinal: soft, NT/ND; active BSx4 quadrants.  Extremities: chronic venous stasis changes b/l, LE warm and perfused; RUE 4th digit w/ bruising and swollen PIP joint.   Vascular: 2+ pulses b/l radial, 2+ RLE PT/DP, unable to palpate LLE PT/DP pulses.  Skin: RLE w/ 12 cm x 3 cm area of erythematous malodorous denuded skin w/o purulence, no palpable rubor. extensive fungal plaques distal LE b/l, including feet. Onychomycosis b/l LE.  Lines/Drains: LUE IV in place.      MEDICATIONS:  Standing:  cephalexin 500 milliGRAM(s) Oral every 6 hours  enoxaparin Injectable 40 milliGRAM(s) SubCutaneous every 24 hours  influenza   Vaccine 0.5 milliLiter(s) IntraMuscular once  lactated ringers. 1000 milliLiter(s) (125 mL/Hr) IV Continuous <Continuous>    ALLERGIES/INTOLERANCES:  No Known Allergies  No Intolerances    LABS:                        9.8    5.74  )-----------( 165      ( 05 Mar 2020 06:38 )             31.2     03-05    144  |  112<H>  |  23  ----------------------------<  66<L>  4.4   |  19<L>  |  0.76    Ca    7.7<L>      05 Mar 2020 06:38  Phos  2.4     03-05  Mg     2.0     03-05    Lactate, Blood (03.04.20 @ 03:18)    Lactate, Blood: 1.6 mmoL/L    Creatine Kinase, Serum (03.05.20 @ 06:38)    Creatine Kinase, Serum: 3374 U/L  Creatine Kinase, Serum (03.04.20 @ 22:45)    Creatine Kinase, Serum: 4512 U/L  Creatine Kinase, Serum (03.04.20 @ 13:58)    Creatine Kinase, Serum: 5850 U/L  Creatine Kinase, Serum (03.04.20 @ 03:19)    Creatine Kinase, Serum: 19893: TYPE:(C=Critical, N=Notification, A=Abnormal) C    TPro  4.8<L>  /  Alb  2.7<L>  /  TBili  0.6  /  DBili  x   /  AST  174<H>  /  ALT  69<H>  /  AlkPhos  66  03-05    PT/INR - ( 04 Mar 2020 03:18 )   PT: 14.0 sec;   INR: 1.25     PTT - ( 04 Mar 2020 03:18 )  PTT:26.6 sec    CARDIAC MARKERS ( 05 Mar 2020 06:38 )  x     / 0.06 ng/mL / 3374 U/L / x     / 44.8 ng/mL  CARDIAC MARKERS ( 04 Mar 2020 22:45 )  x     / 0.07 ng/mL / 4512 U/L / x     / 57.9 ng/mL  CARDIAC MARKERS ( 04 Mar 2020 13:58 )  x     / x     / 5850 U/L / x     / x      CARDIAC MARKERS ( 04 Mar 2020 04:57 )  0.241 ng/mL / x     / x     / x     / x      CARDIAC MARKERS ( 04 Mar 2020 03:19 )  x     / x     / 43627 U/L / x     / x      CARDIAC MARKERS ( 04 Mar 2020 03:18 )  0.188 ng/mL / x     / x     / x     / x        Serum Pro-Brain Natriuretic Peptide (03.04.20 @ 04:57) - 795    Iron with Total Binding Capacity in AM (03.05.20 @ 06:38)    Iron - Total Binding Capacity.: 165 ug/dL    % Saturation, Iron: 21 %    Iron Total, Serum: 35 ug/dL    Unsaturated Iron Binding Capacity: 130 ug/dL    Urinalysis Basic - ( 03 Mar 2020 18:16 )  Color: Yellow / Appearance: Clear / SG: >=1.030 / pH:   Gluc: x / Ketone: 40 mg/dL  / Bili: Small / Urobili: 0.2 E.U./dL   Blood: x / Protein: 30 mg/dL / Nitrite: NEGATIVE   Leuk Esterase: NEGATIVE / RBC: > 10 /HPF / WBC < 5 /HPF   Sq Epi: x / Non Sq Epi: 5-10 /HPF / Bacteria: Present /HPF    Urine tox screen pan-negative on 03/04/2020    Microbiology:  Culture - Blood (collected 04 Mar 2020 06:10)  Source: .Blood Blood-Venous  Preliminary Report (05 Mar 2020 07:01):  No growth to date.    Culture - Blood (collected 04 Mar 2020 06:10)  Source: .Blood Blood-Venous  Preliminary Report (05 Mar 2020 07:01):  No growth to date.    RADIOLOGY, EKG AND ADDITIONAL TESTS:  CT BRAIN  03/04/2020    IMPRESSION: No acute intracranial hemorrhage, mass effect, or recent transcortical infarction. No significant change since prior CT dated 3/3/2020.    CT ANGIO NECK (W)AW IC  03/04/2020                    IMPRESSION: No high-grade intracranial stenosis or occlusion.    CT ANGIO BRAIN (W)AW IC                        03/04/2020    IMPRESSION: No high-grade stenosis or occlusion in the neck.    CT ABDOMEN AND PELVIS IC  03/04/2020  FINDINGS:  Lower chest: Mild cardiomegaly. Severe coronary artery calcifications. Partially visualized aortic valve calcification. Mitral annular calcifications  Vessels: Severe calcified plaque aorta and major branches. There is noncalcified plaque at the proximal superior mesenteric artery with at least mild stenosis.  Pelvic organs: Mildly distended bladder with multiple small diverticula, suggestive of chronic bladder outlet obstruction. The prostate appears normal.  Bones: S shaped scoliosis of the thoracolumbar spine. Severe degenerative changes.. Several healing right posterior rib fractures.  IMPRESSION: No acute abnormality is identified.    CT CERVICAL SPINE                        03/03/2020    IMPRESSION:   1. Evaluation moderately limited due to patient motion particularly upper cervical spine. No fracture or dislocation identified.   2. Multilevel cervical spondylosis, as above.

## 2020-03-05 NOTE — ADVANCED PRACTICE NURSE CONSULT - ASSESSMENT
Patient presented with bilateral ischial tuberosity hyperpigmentation. The patient stated the discoloration on his buttocks has been there "for a long time". Dry, non-fluctuant scabs noted on bilateral knees. Dry scaly skin noted on bilateral lower extremities and feet. Toes brownish red in color, Bilateral lower extremities warm to touch with bilateral palpable dorsalis pedis pulses. Bilateral heels skin intact. No pressure injuries noted. WOCN applied moisture barrier cream to bilateral lower extremities and feet and reapplied patient's socks. Instructed the patient to apply lotion to bilateral lower extremities and feet twice daily.  The patient verbalized understanding. Provided patient with moisture barrier cream. Patient presented with bilateral ischial tuberosity hyperpigmentation. The patient stated the discoloration on his buttocks has been there "for a long time". Dry, non-fluctuant scabs noted on bilateral knees. Dry scaly skin noted on bilateral lower extremities and feet. Toes brownish red in color. Bilateral lower extremities warm to touch with bilateral palpable dorsalis pedis pulses. Bilateral heels skin intact. No pressure injuries noted. WOCN applied moisture barrier cream to bilateral lower extremities and feet and reapplied patient's socks. Instructed the patient to apply lotion to bilateral lower extremities and feet twice daily.  The patient verbalized understanding. Provided patient with moisture barrier cream.

## 2020-03-05 NOTE — DISCHARGE NOTE PROVIDER - CARE PROVIDERS DIRECT ADDRESSES
[email or web address]

## 2020-03-05 NOTE — PROGRESS NOTE ADULT - PROBLEM SELECTOR PLAN 1
Pt with 5 days of unsteady gate since Saturday and 6 falls per patient. He claims all of them have been mechanical and due to instability on R foot/ankle. No LOC, headache, dizziness, vertigo, blurry vision, palpitations. Neuro exam is normal.  In the ED his head and neck imaging have been unremarkable. Possible etiologies include: mechanical 2/2 malnutrition (vitamin deficiency versus muscle wasting); neurological 2/2 spinal cord impingement, peripheral neuropathy, or impingement. Likely not syncopal given lack of LOC and negative neck angio, absence of arrythmia on ECG. Most likely diagnosis is mechanical falls d/t malnutrition.  - MRI Lumbar spine w/o contrast to search for nerve impingement given CT showing cervical spondylosis  - Pending Vitamin B12, folate, RPR looking for B12 deficiency due to malnutrition  - Pending PT/OT eval P/w unsteady gait x5d and 6 falls per patient. He claims all of them have been mechanical and due to instability on R foot/ankle. Denies LOC, headache, dizziness, vertigo, blurry vision, palpitations. Neuro exam s/f diminished sensation in LLE, ?RLE, remainder wnl. Etiology most likely deconditioning and malnutrition in setting of frailty of physical exam, endorsed decreased PO, diminished sensation (2/2 vitamin def.) Other etiologies include: peripheral neuropathy, spinal cord involvement in setting of cervical spondylosis), Unlikely syncopal given lack of LOC and negative neck angio, absence of arrythmia on ECG.  - CTH, CTA head&neck negative for hemorrhage or infarct. CT cervical spine w/ cervical spondylosis.  - MRI Lumbar spine w/o contrast to search for nerve impingement given CT showing cervical spondylosis. Will f/u results.  - f/u b12 and folate due to malnutrition and macrocytic anemia seen on CBC.  - f/u RPR  - Pending PT/OT eval

## 2020-03-05 NOTE — PROGRESS NOTE ADULT - PROBLEM SELECTOR PLAN 6
ADDENDUM: seen on CT a/p; possible mechanical cause of gait issues; 5/5 motor strength of the lower ext; pt w/ episode of soiling self at [facility] as unable to get to bathroom in timely manner (less likely dysfunction); further imaging w/ MRI ordered for further clarification of sxs. will likely need rollator instead of cane for ambulation. Seen on CT a/p. Possible mechanical cause of gait issues; 5/5 motor strength of the lower ext  - Further imaging w/ MRI lumbar spine ordered for further clarification  - F/u PT/OT recs  - Will likely need rollator instead of cane for ambulation Trop I elevated from 0.18 --> 0.24 at [facility], w/ [place] [address]. TWI in inferior and lateral leads on EKG however low concern for NSTEMI by cardiology. Etiology of ischemic changes likely demand ischemia 2/2 rhabdo. Pt continues to deny chest pain or palpitations. Repeat EKG w/ resolution of ischemic changes.   -Troponin trended to peak, no need to further trend at this time.  -Continue to monitor for chest pain  -Of note, pt w/ , however no signs of fluid overload on physical exam (no JVD, LE edema, lungs CTAB). ECHO w/o cardiac dysfunction.

## 2020-03-05 NOTE — DISCHARGE NOTE PROVIDER - HOSPITAL COURSE
#Discharge: do not delete        Patient is 78yo M with no past medical history    Presented with _____, found to have _____        Problem List/Main Diagnoses (system-based):         Inpatient treatment course:         New medications:     Labs to be followed outpatient: NONE    Exam to be followed outpatient: NONE #Discharge: do not delete        Patient is 78yo M with no past medical history    Presented with _____, found to have _____        Problem List/Main Diagnoses (system-based):         Inpatient treatment course:         New medications:     Labs to be followed outpatient: b12    Exam to be followed outpatient: neurologic exam

## 2020-03-05 NOTE — DISCHARGE NOTE PROVIDER - CARE PROVIDER_API CALL
Albert Mcfarland)  Internal Medicine  [address] [place] [address]  Phone: [phone]  Fax: [phone]  Follow Up Time:

## 2020-03-05 NOTE — PROGRESS NOTE ADULT - PROBLEM SELECTOR PLAN 9
Extensive plaques present b/l LEs, chronic, untreated  - Consider podiatry consult Pt without PCP, will attempt to coordinate follow-up on discharge

## 2020-03-05 NOTE — OCCUPATIONAL THERAPY INITIAL EVALUATION ADULT - PLANNED THERAPY INTERVENTIONS, OT EVAL
transfer training/ADL retraining/IADL retraining/bed mobility training/strengthening/stretching/ROM/balance training

## 2020-03-06 DIAGNOSIS — Z04.3 ENCOUNTER FOR EXAMINATION AND OBSERVATION FOLLOWING OTHER ACCIDENT: ICD-10-CM

## 2020-03-06 DIAGNOSIS — S63.289D DISLOCATION OF PROXIMAL INTERPHALANGEAL JOINT OF UNSPECIFIED FINGER, SUBSEQUENT ENCOUNTER: ICD-10-CM

## 2020-03-06 DIAGNOSIS — E53.8 DEFICIENCY OF OTHER SPECIFIED B GROUP VITAMINS: ICD-10-CM

## 2020-03-06 PROBLEM — Z78.9 OTHER SPECIFIED HEALTH STATUS: Chronic | Status: ACTIVE | Noted: 2020-03-04

## 2020-03-06 LAB
ALBUMIN SERPL ELPH-MCNC: 3 G/DL — LOW (ref 3.3–5)
ALP SERPL-CCNC: 71 U/L — SIGNIFICANT CHANGE UP (ref 40–120)
ALT FLD-CCNC: 69 U/L — HIGH (ref 10–45)
ANION GAP SERPL CALC-SCNC: 14 MMOL/L — SIGNIFICANT CHANGE UP (ref 5–17)
AST SERPL-CCNC: 125 U/L — HIGH (ref 10–40)
BILIRUB SERPL-MCNC: 0.6 MG/DL — SIGNIFICANT CHANGE UP (ref 0.2–1.2)
BUN SERPL-MCNC: 18 MG/DL — SIGNIFICANT CHANGE UP (ref 7–23)
CALCIUM SERPL-MCNC: 8 MG/DL — LOW (ref 8.4–10.5)
CHLORIDE SERPL-SCNC: 104 MMOL/L — SIGNIFICANT CHANGE UP (ref 96–108)
CO2 SERPL-SCNC: 22 MMOL/L — SIGNIFICANT CHANGE UP (ref 22–31)
CREAT SERPL-MCNC: 0.78 MG/DL — SIGNIFICANT CHANGE UP (ref 0.5–1.3)
GLUCOSE SERPL-MCNC: 97 MG/DL — SIGNIFICANT CHANGE UP (ref 70–99)
HCT VFR BLD CALC: 32.9 % — LOW (ref 39–50)
HGB BLD-MCNC: 10.6 G/DL — LOW (ref 13–17)
MAGNESIUM SERPL-MCNC: 1.7 MG/DL — SIGNIFICANT CHANGE UP (ref 1.6–2.6)
MCHC RBC-ENTMCNC: 32.2 GM/DL — SIGNIFICANT CHANGE UP (ref 32–36)
MCHC RBC-ENTMCNC: 32.7 PG — SIGNIFICANT CHANGE UP (ref 27–34)
MCV RBC AUTO: 101.5 FL — HIGH (ref 80–100)
NRBC # BLD: 0 /100 WBCS — SIGNIFICANT CHANGE UP (ref 0–0)
PLATELET # BLD AUTO: 172 K/UL — SIGNIFICANT CHANGE UP (ref 150–400)
POTASSIUM SERPL-MCNC: 4.1 MMOL/L — SIGNIFICANT CHANGE UP (ref 3.5–5.3)
POTASSIUM SERPL-SCNC: 4.1 MMOL/L — SIGNIFICANT CHANGE UP (ref 3.5–5.3)
PROT SERPL-MCNC: 5 G/DL — LOW (ref 6–8.3)
RBC # BLD: 3.24 M/UL — LOW (ref 4.2–5.8)
RBC # FLD: 14 % — SIGNIFICANT CHANGE UP (ref 10.3–14.5)
SODIUM SERPL-SCNC: 140 MMOL/L — SIGNIFICANT CHANGE UP (ref 135–145)
T PALLIDUM AB TITR SER: NEGATIVE — SIGNIFICANT CHANGE UP
WBC # BLD: 6.31 K/UL — SIGNIFICANT CHANGE UP (ref 3.8–10.5)
WBC # FLD AUTO: 6.31 K/UL — SIGNIFICANT CHANGE UP (ref 3.8–10.5)

## 2020-03-06 PROCEDURE — 72148 MRI LUMBAR SPINE W/O DYE: CPT | Mod: 26

## 2020-03-06 PROCEDURE — 99233 SBSQ HOSP IP/OBS HIGH 50: CPT | Mod: GC

## 2020-03-06 RX ORDER — MAGNESIUM SULFATE 500 MG/ML
2 VIAL (ML) INJECTION ONCE
Refills: 0 | Status: COMPLETED | OUTPATIENT
Start: 2020-03-06 | End: 2020-03-06

## 2020-03-06 RX ADMIN — ENOXAPARIN SODIUM 40 MILLIGRAM(S): 100 INJECTION SUBCUTANEOUS at 07:36

## 2020-03-06 RX ADMIN — PREGABALIN 1000 MICROGRAM(S): 225 CAPSULE ORAL at 15:47

## 2020-03-06 RX ADMIN — Medication 500 MILLIGRAM(S): at 06:34

## 2020-03-06 RX ADMIN — SODIUM CHLORIDE 125 MILLILITER(S): 9 INJECTION, SOLUTION INTRAVENOUS at 06:34

## 2020-03-06 RX ADMIN — Medication 50 GRAM(S): at 07:35

## 2020-03-06 NOTE — PROGRESS NOTE ADULT - PROBLEM SELECTOR PLAN 7
Seen on CT a/p. Possible mechanical cause of gait issues; 5/5 motor strength of the lower ext  - Further imaging w/ MRI lumbar spine ordered for further clarification  - F/u PT/OT recs  - Will likely need rollator instead of cane for ambulation Seen on CT a/p. Possible mechanical cause of gait issues; 5/5 motor strength of the lower ext  - F/u MRI lumbar spine  - PT recommends subacute rehab on discharge Resolved

## 2020-03-06 NOTE — PROGRESS NOTE ADULT - SUBJECTIVE AND OBJECTIVE BOX
Physical Medicine and Rehabilitation Progress Note:    Patient is a 79y old  Male who presents with a chief complaint of Weakness (06 Mar 2020 06:33)      HPI:  A 79 years old male with no PMH, presented to [facility] initially on Saturday because of fall. as per pt the fall was a mechanical fall that was witnessed by a passenger. States that since around few days before that he started feeling unbalanced and had fallen few times before that and all of them were mechanical fall w/o losing his consciousness. In ED he refused to get any care and signed out AMA but as per chart and the pt, he never was able to leave the ED. States that after discharge he tried to take the train to go to his apartment in [place] but he could not (because felt so weak) and had to stay in ED. He also states that when he tried to stand up and go home he fell in front of the ED and so somebody helped him to go back to ED. This time he underwent head CT as well as CTA head and neck and CT abd/pelvis that were normal with no acute changes. His labs showed high CK as well as elevated Trop I with some ECG changes (unknown chronicity), and so the cardiology team was consulted but the decision was not to pursue any cardiac work up since the lab changes were due to rhabdomyolysis and not ACS. Pt received a dose of ceftriaxone and 2 liters of fluid and was transferred to [facility] for further assessment. His CK came down from 76910 to >5000 after receiving fluids.   Pt denied having headache, dizziness, blurry vision, nausea, vomiting, chest pain, shortness of breath, cough, abdominal pain, diarrhea, constipation, urinary symptoms, rash, joint pain, fever, chills, sick contact or recent travel during the past few days. States that has eaten and drunk well (as his usual).  In [facility] his vitals were stable although he had a T of 96.5 last night in [facility]. His CBC showed WBC 10.03, Hb 11.7. Na was 144, K 2.9, BUN 33 and Cr 0.83, Ca 7.6. he was started on 125 ml/hr of normal saline. (04 Mar 2020 23:01)                            10.6   6.31  )-----------( 172      ( 06 Mar 2020 05:41 )             32.9       03-06    140  |  104  |  18  ----------------------------<  97  4.1   |  22  |  0.78    Ca    8.0<L>      06 Mar 2020 05:41  Phos  2.4     03-05  Mg     1.7     03-06    TPro  5.0<L>  /  Alb  3.0<L>  /  TBili  0.6  /  DBili  x   /  AST  125<H>  /  ALT  69<H>  /  AlkPhos  71  03-06    Vital Signs Last 24 Hrs  T(C): 36.3 (06 Mar 2020 08:45), Max: 36.9 (05 Mar 2020 16:25)  T(F): 97.3 (06 Mar 2020 08:45), Max: 98.4 (05 Mar 2020 16:25)  HR: 85 (06 Mar 2020 08:45) (77 - 96)  BP: 142/75 (06 Mar 2020 08:45) (134/76 - 161/86)  BP(mean): --  RR: 18 (06 Mar 2020 08:45) (18 - 19)  SpO2: 95% (06 Mar 2020 08:45) (95% - 99%)    MEDICATIONS  (STANDING):  cephalexin 500 milliGRAM(s) Oral every 6 hours  cyanocobalamin Injectable 1000 MICROGram(s) IntraMuscular every 24 hours  enoxaparin Injectable 40 milliGRAM(s) SubCutaneous every 24 hours  influenza   Vaccine 0.5 milliLiter(s) IntraMuscular once  lactated ringers. 1000 milliLiter(s) (125 mL/Hr) IV Continuous <Continuous>    MEDICATIONS  (PRN):    Currently Undergoing Physical Therapy at bedside.    Functional Status Assessment:  3/5/2020    Previous Level of Function:     · Ambulation Skills	independent; cane recently	  · Transfer Skills	independent	  · ADL Skills	independent	  · Additional Comments	Pt. reports recent use of cane since new onset of weakness/falls, previously no assistive device. Pt. has 1 flight of stairs to negotiate in his home environment.	    Cognitive Status Examination:   · Orientation	person; time; situation	  · Level of Consciousness	alert	  · Follows Commands and Answers Questions	100% of the time	  · Personal Safety and Judgment	impaired	    Range of Motion Exam:   · Range of Motion Examination	no ROM deficits were identified	    MMT Shoulder:     · Shoulder Flexion	(5) normal, left  (5) normal, right	  · Shoulder Extension	(5) normal, left  (5) normal, right	    MMT Elbow:     · Elbow Flexion	(5) normal, left  (5) normal, right	  · Elbow Extension	(5) normal, left  (5) normal, right	    MMT Hip:     · Hip Flexion	4+ = good plus  4+ = good plus	    MMT Knee:     · Knee Flexion	(5) normal, left  (5) normal, right	  · Knee Extension	(5) normal, left  (5) normal, right	    MMT Ankle:     · Ankle Dorsiflexion	(5) normal, left  (5) normal, right	  · Ankle Plantarflexion	(5) normal, left  (5) normal, right	    Bed Mobility: Rolling/Turning:     · Level of [place]	independent	    Bed Mobility: Scooting/Bridging:     · Level of [place]	independent	    Bed Mobility: Sit to Supine:     · Level of [place]	supervision	  · Physical Assist/Nonphysical Assist	verbal cues	    Bed Mobility: Supine to Sit:     · Level of [place]	contact guard; minimum assist (75% patients effort)	  · Physical Assist/Nonphysical Assist	1 person assist	    Transfer: Sit to Stand:     · Level of [place]	minimum assist (75% patients effort)	  · Physical Assist/Nonphysical Assist	1 person assist	  · Weight-Bearing Restrictions	weight-bearing as tolerated	  · Assistive Device	rolling walker	    Transfer: Stand to Sit:     · Level of [place]	contact guard	  · Physical Assist/Nonphysical Assist	verbal cues; 1 person assist	  · Weight-Bearing Restrictions	weight-bearing as tolerated	  · Assistive Device	rolling walker	    Sit/Stand Transfer Safety Analysis:     · Transfer Safety Concerns Noted	decreased safety awareness; decreased weight-shifting ability	  · Impairments Contributing to Impaired Transfers	impaired balance	    Gait Skills:     · Level of [place]	contact guard; minimum assist (75% patients effort)	  · Physical Assist/Nonphysical Assist	1 person assist	  · Weight-Bearing Restrictions	weight-bearing as tolerated	  · Assistive Device	rolling walker	  · Gait Distance	35'x2	    Gait Analysis:     · Gait Deviations Noted	decreased brenda; increased time in double stance; decreased step length	  · Impairments Contributing to Gait Deviations	impaired balance	    Stair Negotiation:     · Level of [place]	TBA	    Balance Skills Assessment:     · Sitting Balance: Static	good balance	  · Sitting Balance: Dynamic	good minus	  · Sit-to-Stand Balance	fair balance	  · Standing Balance: Static	fair plus	  · Standing Balance: Dynamic	fair balance	    Sensory Examination:   Sensory Examination:    Light Touch Sensation:   · Left LE	within normal limits	  · Right LE	within normal limits	      Proprioception:   · Left LE	within normal limits	  · Right LE	within normal limits	      Fine Motor Coordination:   Fine Motor Coordination Examination:    Fine Motor Coordination:   · Left Hand, Finger to Nose	normal performance	  · Right Hand, Finger to Nose	normal performance	  · Left Hand Thumb/Finger Opposition Skills	normal performance	  · Right Hand Thumb/Finger Opposition Skills	normal performance	  · Left Hand, Diadochokinesis Skills	normal performance	  · Right Hand, Diadochokinesis Skills	normal performance	    Clinical Impressions:   · Criteria for Skilled Therapeutic Interventions	impairments found; functional limitations in following categories	  · Impairments Found (describe specific impairments)	aerobic capacity/endurance; gait, locomotion, and balance	  · Functional Limitations in Following Categories (describe specific limitations)	self-care; home management; community/leisure	  · Risk Reduction/Prevention (Describe Specific Areas of risk reduction/prevention)	risk factors	  · Risk Areas	fall	  · Rehab Potential	good, to achieve stated therapy goals	  · Therapy Frequency	2-3x/week	  · Predicted Duration of Therapy Intervention (days/wks)	Pt. would benefit from cont. PT follow up to improve gait, transfers, balance, endurance.	  · Anticipated Equipment Needs at Discharge	rolling walker (5 inch wheels)	        PM&R Impression: as above    Current Disposition Plan Recommendations: subacute rehab placement

## 2020-03-06 NOTE — PROGRESS NOTE ADULT - PROBLEM SELECTOR PLAN 1
P/w unsteady gait x5d and 6 falls per patient. He claims all of them have been mechanical and due to instability on R foot/ankle. Denies LOC, headache, dizziness, vertigo, blurry vision, palpitations. Neuro exam s/f diminished sensation in LLE, ?RLE, remainder wnl. Etiology most likely deconditioning and malnutrition in setting of frailty of physical exam, endorsed decreased PO, diminished sensation (2/2 vitamin def.) Other etiologies include: peripheral neuropathy, spinal cord involvement in setting of cervical spondylosis), Unlikely syncopal given lack of LOC and negative neck angio, absence of arrythmia on ECG.  - CTH, CTA head&neck negative for hemorrhage or infarct. CT cervical spine w/ cervical spondylosis.  - MRI Lumbar spine w/o contrast to search for nerve impingement given CT showing cervical spondylosis. Will f/u results.  - f/u b12 and folate due to malnutrition and macrocytic anemia seen on CBC.  - f/u RPR  - Pending PT/OT eval P/w unsteady gait x5d and 6 falls per patient. He claims all of them have been mechanical and due to instability on R foot/ankle. Denies LOC, headache, dizziness, vertigo, blurry vision, palpitations. Neuro exam s/f diminished sensation in LLE, ?RLE, remainder wnl. Etiology most likely deconditioning and malnutrition in setting of frailty of physical exam, endorsed decreased PO, diminished sensation (2/2 vitamin def.) Other etiologies include: peripheral neuropathy, spinal cord involvement in setting of cervical spondylosis), Unlikely syncopal given lack of LOC and negative neck angio, absence of arrythmia on ECG.  - B12 level decreased, supports SCD diagnosis leading to gait instability in the setting of vitamin B12 deficiency from malnutrition versus pernicious anemia. Ordered intrinsic factor Ab.  - CTH, CTA head&neck negative for hemorrhage or infarct. CT cervical spine w/ cervical spondylosis.  - MRI Lumbar spine w/o contrast to search for nerve impingement given CT showing cervical spondylosis. Will f/u results.

## 2020-03-06 NOTE — PROGRESS NOTE ADULT - PROBLEM SELECTOR PLAN 5
Extensive plaques present b/l LEs, chronic, untreated in past.  - Pt to follow up as outpatient for systemic tx

## 2020-03-06 NOTE — PROGRESS NOTE ADULT - SUBJECTIVE AND OBJECTIVE BOX
Patient is a 79y old  Male who presents with a chief complaint of Weakness (06 Mar 2020 09:15)      INTERVAL HPI/OVERNIGHT EVENTS:    Pt. seen and examined this morning  Pt. c/o generalized weakness and debility, otherwise no new complaints  Ambulating w/ PT    Review of Systems: 12 point review of systems otherwise negative    MEDICATIONS  (STANDING):  cyanocobalamin Injectable 1000 MICROGram(s) IntraMuscular every 24 hours  enoxaparin Injectable 40 milliGRAM(s) SubCutaneous every 24 hours  influenza   Vaccine 0.5 milliLiter(s) IntraMuscular once    MEDICATIONS  (PRN):      Allergies    No Known Allergies    Intolerances          Vital Signs Last 24 Hrs  T(C): 36.3 (06 Mar 2020 08:45), Max: 36.9 (05 Mar 2020 16:25)  T(F): 97.3 (06 Mar 2020 08:45), Max: 98.4 (05 Mar 2020 16:25)  HR: 85 (06 Mar 2020 08:45) (77 - 96)  BP: 142/75 (06 Mar 2020 08:45) (134/76 - 161/86)  BP(mean): --  RR: 18 (06 Mar 2020 08:45) (18 - 19)  SpO2: 95% (06 Mar 2020 08:45) (95% - 99%)  CAPILLARY BLOOD GLUCOSE          -05 @ 07:  -   @ 07:00  --------------------------------------------------------  IN: 1375 mL / OUT: 400 mL / NET: 975 mL     @ 07:01  -   @ 15:25  --------------------------------------------------------  IN: 125 mL / OUT: 450 mL / NET: -325 mL        Physical Exam:  (this morning)  Daily     Daily Weight in k.5 (06 Mar 2020 14:40)  General:  comfortable-appearing in NAD, underweight  HEENT: MMM  CV:  no JVD  Abdomen:  soft NT ND  Extremities:  B/L LE w/ abrasions, RLE w/ resolving erythema, R 4th finger discolored (chronic, per Pt.)  Skin:  WWP  Neuro:  AAOx3, B/L  strength     LABS:                        10.6   6.31  )-----------( 172      ( 06 Mar 2020 05:41 )             32.9     03-06    140  |  104  |  18  ----------------------------<  97  4.1   |  22  |  0.78    Ca    8.0<L>      06 Mar 2020 05:41  Phos  2.4     03-05  Mg     1.7     03-06    TPro  5.0<L>  /  Alb  3.0<L>  /  TBili  0.6  /  DBili  x   /  AST  125<H>  /  ALT  69<H>  /  AlkPhos  71  03-06            RADIOLOGY & ADDITIONAL TESTS:    ---------------------------------------------------------------------------  I personally reviewed: [  ]EKG   [  ]CXR    [  ] CT    [  ]Other  ---------------------------------------------------------------------------  PLEASE CHECK WHEN PRESENT:     [  ]Heart Failure     [  ] Acute     [  ] Acute on Chronic     [  ] Chronic  -------------------------------------------------------------------     [  ]Diastolic [HFpEF]     [  ]Systolic [HFrEF]     [  ]Combined [HFpEF & HFrEF]     [  ]Other:  -------------------------------------------------------------------  [  ]LANCE     [  ]ATN     [  ]Reneal Medullary Necrosis     [  ]Renal Cortical Necrosis     [  ]Other Pathological Lesions:    [  ]CKD 1  [  ]CKD 2  [  ]CKD 3  [  ]CKD 4  [  ]CKD 5  [  ]Other  -------------------------------------------------------------------  [  ]Other/Unspecified:    --------------------------------------------------------------------    Abdominal Nutritional Status  [  ]Malnutrition: See Nutrition Note  [  ]Cachexia  [  ]Other:   [  ]Supplement Ordered:  [  ]Morbid Obesity (BMI >=40]

## 2020-03-06 NOTE — PROGRESS NOTE ADULT - ASSESSMENT
79M with no known PMH (has not seen a PMD in decades), initially presented to [facility] 3/2 for fall, returned 3/4 due to additional falls, found to have rhabdomyolysis. Pt transferred to [facility] for further evaluation and admitted to [facility] for workup of falls and management of rhabdomyolysis and LE cellulitis. 79M with no known PMH (has not seen a PMD in decades), initially presented to [facility] 3/2 for fall, returned 3/4 due to additional falls, found to have rhabdomyolysis. Pt transferred to [facility] for further evaluation and admitted to [facility] for workup of falls and management of rhabdomyolysis and LE cellulitis vs fungal infection.

## 2020-03-06 NOTE — PROGRESS NOTE ADULT - PROBLEM SELECTOR PLAN 4
RLE w/ demarcated erythema w/o rubor, purulence, or tender to palpation. Afebrile, initial leukocytosis on admission however likely hemoconcentration in setting of malnutrition, most recent WBC 5. Pt started on ceftriaxone for LE cellulitis, transitioned to keflex.  -Erythema appears to be 2/2 chronic fungal infxn. DDX includes fungal infxn vs bacterial cellulitis vs. traumatic skin lesion from fall.  - Wound care consulted, f/u recs  - Will continue off abx for now

## 2020-03-06 NOTE — PROGRESS NOTE ADULT - PROBLEM SELECTOR PLAN 1
likely d/t debility and B12 deficiency; cont. PT/OT; checking MRI L-spine (can be done outpatient), check R hand x-ray r/o finger fx

## 2020-03-06 NOTE — DIETITIAN INITIAL EVALUATION ADULT. - PROBLEM SELECTOR PLAN 1
Pt with few days of unsteady gait and few falls in the past few days that claims all of them have been mechanical and due to loss of balance. Denies any loss of consciousness, headache, dizziness or blurry vision or any symptoms in the past few days. denies any PMH. Neuro exam is normal.  In the ED his head and neck imaging have been unremarkable.     - MRI LSspine w/o contrast  - vitamin B12 and folate in the AM to rule out B12 deficiency due to malnutrition  - PT/OT eval in the morning

## 2020-03-06 NOTE — PROGRESS NOTE ADULT - PROBLEM SELECTOR PROBLEM 3
Anemia, unspecified type Dislocation of proximal interphalangeal joint of finger, subsequent encounter

## 2020-03-06 NOTE — DIETITIAN INITIAL EVALUATION ADULT. - PROBLEM SELECTOR PLAN 3
Pt initially presented to ED 4 days before admission for mechanical fall. but could never leave the ED because of weakness and fall. During this time he has been sitting with minimal movement and could not eat or drink.   In his second visit in ED his CK was elevated to around [address]. His trop I was also elevated. Pt received 2 liters of NS and his repeat CK was 5850. His BUN and Cr are normal and denies any symptoms.     - Switched to  cc/hr  - Monitor kidney function   - No need to trend CK since is downtrending and is around 5000 now

## 2020-03-06 NOTE — PROGRESS NOTE ADULT - PROBLEM SELECTOR PLAN 3
Hgb 9.8 w/ . Macrocytic anemia likely 2/2 vitamin B12 deficiency due to malnutrition versus hypo/hyper thyroidism. Initial weakness may be d/t anemia.  Dimimished sensation in LE may be due to B12 def  -Fe low, however ferritin wnl making iron-deficiency less likely.   - F/u B12 and folate  - f/u TSH  - Will continue to monitor CBC Hgb 10.6 w/ , improved from Hgb 9.8 w/ . Macrocytic anemia likely 2/2 vitamin B12 deficiency given B12 level of <150, diminished sensation, and normal TSH.  - F/u intrinsic factor Ab  - Daily cyanocobalamin 1000mg IM injection, will need continued B12 outpatient  - CTM daily CBC Pt with known past fracture of PIP on R 4th digit, endorses new trauma to finger associated with recent falls. On exam PIP swollen without rubor, entire digit w/ echhymosis. capillary refill intact. pain on palpation.  -R hand XR w/ subluxation/dislocation at the fourth PIP joint.  -Recommend clinical monitoring for improvement

## 2020-03-06 NOTE — DIETITIAN INITIAL EVALUATION ADULT. - PROBLEM SELECTOR PLAN 7
ADDENDUM: seen on CT a/p; possible mechanical cause of gait issues; 5/5 motor strength of the lower ext; pt w/ episode of soiling self at [facility] as unable to get to bathroom in timely manner (less likely dysfunction); further imaging w/ MRI ordered for further clarification of sxs. will likely need rollator instead of cane for ambulation.

## 2020-03-06 NOTE — DIETITIAN INITIAL EVALUATION ADULT. - ADD RECOMMEND
1. C/w B12 supplementation 2. Manage pain prn 3. Monitor and replete lytes 4. Trend wts 5. Reinforce education re: B12 food sources as appropriate - suspect low d/t limited PO intake

## 2020-03-06 NOTE — DIETITIAN INITIAL EVALUATION ADULT. - MALNUTRITION
Based upon NFPE and noted <75% EER >1 month suspect moderate malnutrition in setting of social circumstances moderate PCM in setting of social circumstances

## 2020-03-06 NOTE — DIETITIAN INITIAL EVALUATION ADULT. - PROBLEM SELECTOR PLAN 4
Pt has been in ED for few days with no movement and no food. His CK was elevated as well as his Trop I (0.18 to 0.24). He had some ECG changes including TWI in inferior and lateral leads but cardiology cleared the pt. As per chart the trop changes are because of rhabdomyolysis and not ACS. Pt denies any chest pain or SOB.   Trop T was 0.07 and CKMB was 57.9 here. Pt still asymptomatic.    - Trend trops to peack  - Will call cardiology in am to update them about the changes in trop and CKMB  - ECG for morning to monitor the changes  - echo ordered for the morning

## 2020-03-06 NOTE — PROGRESS NOTE ADULT - PROBLEM SELECTOR PLAN 9
Pt without PCP, will attempt to coordinate follow-up on discharge Nutrition consult.  Fluids: completed LR @ 125/hr, encourage PO intake   Electrolytes-replete as needed  Nutrition - DASH/TLC  Code- Full Code  DVT ppx: Lovenox  GI ppx: none needed  DIspo: [facility]

## 2020-03-06 NOTE — CHART NOTE - NSCHARTNOTEFT_GEN_A_CORE
Upon Nutritional Assessment by the Registered Dietitian your patient was determined to meet criteria / has evidence of the following diagnosis/diagnoses:          [ ]  Mild Protein Calorie Malnutrition        [x ]  Moderate Protein Calorie Malnutrition        [ ] Severe Protein Calorie Malnutrition        [ ] Unspecified Protein Calorie Malnutrition        [ ] Underweight / BMI <19        [ ] Morbid Obesity / BMI > 40    Per physical assessment: Muscle Wasting- Temporal [   ]  Clavicle/Pectoral [x-moderate ]  Shoulder/Deltoid [   ]  Scapula [   ]  Interosseous [   ]  Quadriceps [   ]  Gastrocnemius [   ]; Fat Wasting- Orbital [   ]  Buccal [x-moderate   ]  Triceps [   ]  Rib [   ]--> Suspect moderate PCM in setting of social circumstances 2/2 to physical assessment, <75% EER >1 month; please see malnutrition chart note.     Findings as based on:  •  Comprehensive nutrition assessment and consultation    Treatment:    The following diet has been recommended:    Regular diet + Ensure Enlive TID (1050 kcal, 60g protein, 540mL free H2O)     PROVIDER Section:     By signing this assessment you are acknowledging and agree with the diagnosis/diagnoses assigned by the Registered Dietitian    Comments:

## 2020-03-06 NOTE — PROGRESS NOTE ADULT - PROBLEM SELECTOR PLAN 2
Resolving. Pt presented to [facility] [place] [address] w/ positive blood in urine. Likely due to multiple falls in the setting of malnutrition. CK responsive to IV fluids, most recent CK 3374.  - Renal function is preserved w/ normal BUN/Cr.   - transaminitis ( and ), likely 2/2 rhabdo. Transaminitis is resolving w/ current  and ALT 69.  - Pt finished LR@125cc/hr, encourage PO fluids for now  - No need for trending of CK given improvement and response to fluids Resolved Hgb 10.6 w/ , improved from Hgb 9.8 w/ . Macrocytic anemia likely 2/2 vitamin B12 deficiency given B12 level of <150, diminished sensation, and normal TSH.  - F/u intrinsic factor Ab  - Daily cyanocobalamin 1000mg IM injection, will need continued B12 outpatient  - CTM daily CBC

## 2020-03-06 NOTE — PROGRESS NOTE ADULT - PROBLEM SELECTOR PLAN 8
Nutrition consult.  Fluids: completed LR @ 125/hr, encourage PO intake   Electrolytes-replete as needed  Nutrition - DASH/TLC  Code- Full Code  DVT ppx: Lovenox  GI ppx: none needed  DIspo: [facility] Seen on CT a/p. Possible mechanical cause of gait issues; 5/5 motor strength of the lower ext  - F/u MRI lumbar spine  - PT recommends subacute rehab on discharge

## 2020-03-06 NOTE — DIETITIAN INITIAL EVALUATION ADULT. - OTHER INFO
79M with no known PMH (has not seen a PMD in decades), initially presented to [facility] 3/2 for fall, returned 3/4 due to additional falls, found to have rhabdomyolysis. Pt transferred to [facility] for further evaluation and admitted to [facility] for workup of falls and management of rhabdomyolysis and LE cellulitis. Pt questionable historian, endorsing he lives in apartment alone however was found in garage by [employer] workers per CM. At time of assessment pt noting he is attempting to leave [place] and live with brother in [place] as he has limited resources here, unable to afford food, reports unable to afford $5/ day worth of food, consuming mainly bread and apples? at times meat if he can afford it, acknowledged B12 is low, discussed B12 food sources. Will reach out to  to discuss meal assistance programs, pt amenable to Meals on Wheels to improve nutrition. Notes wt has been stable despite lack of food/ resources, has been 63-65kg entire life. Notable muscle/ fat wasting in clavicle/ chest. Observed 100% of tray consumed at time of visit. Suspect decreased PO 2/2 limited resources. No noted n/v/d/c, chewing/swallowing issues or pain impacting intake, skin with B/L cellulitis. Encouraged intake through day to best meet needs, would liberalize diet to regular + EnsureTID to optimize nutritional status. Per report plan to DC tomorrow 3/7. Will follow per protocol.

## 2020-03-06 NOTE — PROGRESS NOTE ADULT - PROBLEM SELECTOR PLAN 6
Trop I elevated from 0.18 --> 0.24 at [facility], [place]/ [place] [address]. TWI in inferior and lateral leads on EKG however low concern for NSTEMI by cardiology. Etiology of ischemic changes likely demand ischemia 2/2 rhabdo. Pt continues to deny chest pain or palpitations. Repeat EKG w/ resolution of ischemic changes.   -Troponin trended to peak, no need to further trend at this time.  -Continue to monitor for chest pain  -Of note, pt w/ , however no signs of fluid overload on physical exam (no JVD, LE edema, lungs CTAB). ECHO w/o cardiac dysfunction. Resolved

## 2020-03-06 NOTE — DIETITIAN INITIAL EVALUATION ADULT. - PROBLEM SELECTOR PLAN 5
Has a demarcated redness and slight warmness in the right leg but with no tenderness or swelling or abscess. 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 Pt denies any pain and does not remember when it has started. Did not have fever but had one time of hypothermia in ED. His WBC was 11.23 at the time of presentation and improved with few doses of ABx (including a dose of ceftriaxone).     - started him on cephalexin 500 q6 for 5 days  - will reassess in the morning

## 2020-03-06 NOTE — PROGRESS NOTE ADULT - SUBJECTIVE AND OBJECTIVE BOX
*** INCOMPLETE ***    OVERNIGHT EVENTS: As per overnight staff, there were no acute events overnight    INTERVAL EVENTS:    SUBJECTIVE HPI: Patient seen and examined at bedside. Patient resting comfortably, no complaints at this time. Patient denies: fever, chills, weakness, dizziness, headaches, changes in vision, chest pain, palpitations, shortness of breath, cough, N/V, diarrhea or constipation, dysuria, LE edema. ROS otherwise negative.      VITAL SIGNS:  Vital Signs Last 24 Hrs  T(C): 36.6 (06 Mar 2020 05:25), Max: 36.9 (05 Mar 2020 16:25)  T(F): 97.9 (06 Mar 2020 05:25), Max: 98.4 (05 Mar 2020 16:25)  HR: 96 (06 Mar 2020 05:25) (77 - 96)  BP: 161/86 (06 Mar 2020 05:25) (134/76 - 161/86)  BP(mean): --  RR: 19 (06 Mar 2020 05:25) (18 - 19)  SpO2: 99% (06 Mar 2020 05:25) (98% - 99%)      03-04-20 @ 07:01  -  03-05-20 @ 07:00  --------------------------------------------------------  IN: 2375 mL / OUT: 650 mL / NET: 1725 mL    03-05-20 @ 07:01  -  03-06-20 @ 06:34  --------------------------------------------------------  IN: 1000 mL / OUT: 400 mL / NET: 600 mL        PHYSICAL EXAM:  General: WDWN, comfortable in bed, NAD  Neurological: AAOx3, no focal deficits  HEENT: NC/AT; EOMI, PERRL, clear conjunctiva, no nasal or oropharyngeal discharge or exudates, discharge, MMM  Neck: supple, no cervical or post-auricular lymphadenopathy  Cardiovascular: +S1/S2, no murmurs/rubs/gallops, RRR  Respiratory: CTA B/L, no diminished breath sounds, no wheezes/rales/rhonchi, no increased work of breathing or accessory muscle use  Gastrointestinal: soft, NT/ND; active BSx4 quadrants  Genitourinary: no suprapubic tenderness, no CVA tenderness  Extremities: WWP; no edema, clubbing or cyanosis  Vascular: 2+ radial, DP/PT pulses B/L  Skin: no rashes  Lines/Drains:       MEDICATIONS:  MEDICATIONS  (STANDING):  cephalexin 500 milliGRAM(s) Oral every 6 hours  cyanocobalamin Injectable 1000 MICROGram(s) IntraMuscular every 24 hours  enoxaparin Injectable 40 milliGRAM(s) SubCutaneous every 24 hours  influenza   Vaccine 0.5 milliLiter(s) IntraMuscular once  lactated ringers. 1000 milliLiter(s) (125 mL/Hr) IV Continuous <Continuous>    MEDICATIONS  (PRN):      ALLERGIES/INTOLERANCES:  Allergies    No Known Allergies    Intolerances        LABS:                        10.6   6.31  )-----------( 172      ( 06 Mar 2020 05:41 )             32.9     03-05    144  |  112<H>  |  23  ----------------------------<  66<L>  4.4   |  19<L>  |  0.76    Ca    7.7<L>      05 Mar 2020 06:38  Phos  2.4     03-05  Mg     2.0     03-05    TPro  4.8<L>  /  Alb  2.7<L>  /  TBili  0.6  /  DBili  x   /  AST  174<H>  /  ALT  69<H>  /  AlkPhos  66  03-05      CARDIAC MARKERS ( 05 Mar 2020 06:38 )  x     / 0.06 ng/mL / 3374 U/L / x     / 44.8 ng/mL  CARDIAC MARKERS ( 04 Mar 2020 22:45 )  x     / 0.07 ng/mL / 4512 U/L / x     / 57.9 ng/mL  CARDIAC MARKERS ( 04 Mar 2020 13:58 )  x     / x     / 5850 U/L / x     / x          Microbiology:  Culture - Blood (collected 04 Mar 2020 06:10)  Source: .Blood Blood-Venous  Preliminary Report (05 Mar 2020 07:01):  No growth to date.    Culture - Blood (collected 04 Mar 2020 06:10)  Source: .Blood Blood-Venous  Preliminary Report (05 Mar 2020 07:01):  No growth to date.        RADIOLOGY, EKG AND ADDITIONAL TESTS: Reviewed. *** INCOMPLETE ***    OVERNIGHT EVENTS: As per overnight staff, there were no acute events overnight.    SUBJECTIVE HPI: Patient seen and examined at bedside. Patient resting comfortably. He states that he is doing well. He is still having problems ambulating on R foot. He recalls PT speaking with him yesterday. He has been eating and urinating normally. Patient denies: fever, chills, muscle/joint pain, CP/SOB, cough, N/V/D/C. ROS otherwise negative.    VITAL SIGNS:  Vital Signs Last 24 Hrs  T(C): 36.6 (06 Mar 2020 05:25), Max: 36.9 (05 Mar 2020 16:25)  T(F): 97.9 (06 Mar 2020 05:25), Max: 98.4 (05 Mar 2020 16:25)  HR: 96 (06 Mar 2020 05:25) (77 - 96)  BP: 161/86 (06 Mar 2020 05:25) (134/76 - 161/86)  BP(mean): --  RR: 19 (06 Mar 2020 05:25) (18 - 19)  SpO2: 99% (06 Mar 2020 05:25) (98% - 99%)    03-04-20 @ 07:01  -  03-05-20 @ 07:00  --------------------------------------------------------  IN: 2375 mL / OUT: 650 mL / NET: 1725 mL    03-05-20 @ 07:01  -  03-06-20 @ 06:34  --------------------------------------------------------  IN: 1000 mL / OUT: 400 mL / NET: 600 mL    PHYSICAL EXAM:  General: pleasant frail, unkempt male, comfortable in bed, NAD.  HEENT: NC/AT, clear conjunctiva, MMM, poor dentition.  Cardiovascular: +S1/S2, no murmurs/rubs/gallops, RRR.  Respiratory: CTA B/L, no diminished breath sounds, no wheezes/rales/rhonchi.  Gastrointestinal: soft, NT/ND; active BSx4 quadrants.  Extremities: chronic venous stasis changes b/l, LE warm and perfused; RUE 4th digit w/ bruising and swollen PIP joint.   Skin: RLE w/ 12 cm x 3 cm area of erythematous malodorous denuded skin w/o purulence, no palpable rubor. extensive fungal plaques distal LE b/l, including feet. Onychomycosis b/l LE.  Lines/Drains: LUE IV in place.    MEDICATIONS:  MEDICATIONS  (STANDING):  cephalexin 500 milliGRAM(s) Oral every 6 hours  cyanocobalamin Injectable 1000 MICROGram(s) IntraMuscular every 24 hours  enoxaparin Injectable 40 milliGRAM(s) SubCutaneous every 24 hours  influenza   Vaccine 0.5 milliLiter(s) IntraMuscular once  lactated ringers. 1000 milliLiter(s) (125 mL/Hr) IV Continuous <Continuous>    ALLERGIES/INTOLERANCES:  No Known Allergies  No Intolerances    LABS:             10.6   6.31  )-----------( 172      ( 06 Mar 2020 05:41 )             32.9     03-05  144  |  112<H>  |  23  ----------------------------<  66<L>  4.4   |  19<L>  |  0.76    Ca    7.7<L>      05 Mar 2020 06:38  Phos  2.4     03-05  Mg     2.0     03-05    TPro  4.8<L>  /  Alb  2.7<L>  /  TBili  0.6  /  DBili  x   /  AST  174<H>  /  ALT  69<H>  /  AlkPhos  66  03-05    CARDIAC MARKERS ( 05 Mar 2020 06:38 )  x     / 0.06 ng/mL / 3374 U/L / x     / 44.8 ng/mL  CARDIAC MARKERS ( 04 Mar 2020 22:45 )  x     / 0.07 ng/mL / 4512 U/L / x     / 57.9 ng/mL  CARDIAC MARKERS ( 04 Mar 2020 13:58 )  x     / x     / 5850 U/L / x     / x        Microbiology:  Culture - Blood (collected 04 Mar 2020 06:10)  Source: .Blood Blood-Venous  Preliminary Report (05 Mar 2020 07:01):  No growth to date.    Culture - Blood (collected 04 Mar 2020 06:10)  Source: .Blood Blood-Venous  Preliminary Report (05 Mar 2020 07:01):  No growth to date.    Vitamin B12, Serum in AM (03.05.20 @ 12:31)    Vitamin B12, Serum: <150: Test Repeated pg/mL  Folate, Serum in AM (03.05.20 @ 12:31)    Folate, Serum: 11.7 ng/mL  Thyroid Stimulating Hormone, Serum (03.05.20 @ 06:38)    Thyroid Stimulating Hormone, Serum: 4.405 uIU/mL  Syphilis Screen (03.06.20 @ 01:06)    Treponema Pallidum Antibody Interpretation: Negative    RADIOLOGY, EKG AND ADDITIONAL TESTS:    XR HAND-RIGHT 3 VIEWS                        03/05/2020    3 views of the right hand have been submitted. There is overlap of the osseous structures at the fourth PIP joint suspicious for subluxation/dislocation. It is obscured on the lateral view. Soft tissue swelling is present at the fourth PIP. No fracture is identified. There are scattered arthritic changes, most severe at the first IP joint.  IMPRESSION: Findings suspicious for subluxation/dislocation at the fourth PIP joint.    TTE Echo Complete w/o contrast w/ Doppler  03/05/2020  Study Quality: Study quality was good.  CONCLUSIONS:   1. There is trace tricuspid regurgitation. Pulmonary artery systolic pressure (estimated using the tricuspid regurgitant gradient and an estimate of right atrial pressure) is 39 mmHg.   2. Normal left and right ventricular size and systolic function.   3. No pericardial effusion. INTERVAL EVENTS: vitamin B12 therapy initiated yesterday, s/p vitamin B12 1000mcg IM x1. Pt to receive 1000mcg IM QD until discharge followed by oral supplementation with follow-up.    OVERNIGHT EVENTS: As per overnight staff, there were no acute events overnight.    SUBJECTIVE HPI: Patient seen and examined at bedside. Patient resting comfortably. He states that he is doing well. He is still having problems ambulating on R foot. He has been eating and urinating normally. Patient denies: fever, chills, muscle/joint pain, CP/SOB, cough, N/V/D/C. ROS otherwise negative.    VITAL SIGNS:  Vital Signs Last 24 Hrs  T(C): 36.6 (06 Mar 2020 05:25), Max: 36.9 (05 Mar 2020 16:25)  T(F): 97.9 (06 Mar 2020 05:25), Max: 98.4 (05 Mar 2020 16:25)  HR: 96 (06 Mar 2020 05:25) (77 - 96)  BP: 161/86 (06 Mar 2020 05:25) (134/76 - 161/86)  BP(mean): --  RR: 19 (06 Mar 2020 05:25) (18 - 19)  SpO2: 99% (06 Mar 2020 05:25) (98% - 99%)    03-04-20 @ 07:01  -  03-05-20 @ 07:00  --------------------------------------------------------  IN: 2375 mL / OUT: 650 mL / NET: 1725 mL    03-05-20 @ 07:01  -  03-06-20 @ 06:34  --------------------------------------------------------  IN: 1000 mL / OUT: 400 mL / NET: 600 mL    PHYSICAL EXAM:  General: pleasant frail, unkempt male, comfortable in bed, NAD.  HEENT: NC/AT, clear conjunctiva, MMM, poor dentition.  Cardiovascular: +S1/S2, no murmurs/rubs/gallops, RRR.  Respiratory: CTA B/L, no diminished breath sounds, no wheezes/rales/rhonchi.  Gastrointestinal: soft, NT/ND; active BSx4 quadrants.  Extremities: chronic venous stasis changes b/l, LE warm and perfused; RUE 4th digit w/ bruising and swollen PIP joint.   Skin: RLE w/ 12 cm x 3 cm area of erythematous malodorous denuded skin w/o purulence, no palpable rubor. extensive fungal plaques distal LE b/l, including feet. Onychomycosis b/l LE.  Lines/Drains: LUE IV in place.    MEDICATIONS:  MEDICATIONS  (STANDING):  cephalexin 500 milliGRAM(s) Oral every 6 hours  cyanocobalamin Injectable 1000 MICROGram(s) IntraMuscular every 24 hours  enoxaparin Injectable 40 milliGRAM(s) SubCutaneous every 24 hours  influenza   Vaccine 0.5 milliLiter(s) IntraMuscular once  lactated ringers. 1000 milliLiter(s) (125 mL/Hr) IV Continuous <Continuous>    ALLERGIES/INTOLERANCES:  No Known Allergies  No Intolerances    LABS:             10.6   6.31  )-----------( 172      ( 06 Mar 2020 05:41 )             32.9     03-05  144  |  112<H>  |  23  ----------------------------<  66<L>  4.4   |  19<L>  |  0.76    Ca    7.7<L>      05 Mar 2020 06:38  Phos  2.4     03-05  Mg     2.0     03-05    TPro  4.8<L>  /  Alb  2.7<L>  /  TBili  0.6  /  DBili  x   /  AST  174<H>  /  ALT  69<H>  /  AlkPhos  66  03-05    CARDIAC MARKERS ( 05 Mar 2020 06:38 )  x     / 0.06 ng/mL / 3374 U/L / x     / 44.8 ng/mL  CARDIAC MARKERS ( 04 Mar 2020 22:45 )  x     / 0.07 ng/mL / 4512 U/L / x     / 57.9 ng/mL  CARDIAC MARKERS ( 04 Mar 2020 13:58 )  x     / x     / 5850 U/L / x     / x        Microbiology:  Culture - Blood (collected 04 Mar 2020 06:10)  Source: .Blood Blood-Venous  Preliminary Report (05 Mar 2020 07:01):  No growth to date.    Culture - Blood (collected 04 Mar 2020 06:10)  Source: .Blood Blood-Venous  Preliminary Report (05 Mar 2020 07:01):  No growth to date.    Vitamin B12, Serum in AM (03.05.20 @ 12:31)    Vitamin B12, Serum: <150: Test Repeated pg/mL  Folate, Serum in AM (03.05.20 @ 12:31)    Folate, Serum: 11.7 ng/mL  Thyroid Stimulating Hormone, Serum (03.05.20 @ 06:38)    Thyroid Stimulating Hormone, Serum: 4.405 uIU/mL  Syphilis Screen (03.06.20 @ 01:06)    Treponema Pallidum Antibody Interpretation: Negative    RADIOLOGY, EKG AND ADDITIONAL TESTS:    XR HAND-RIGHT 3 VIEWS                        03/05/2020    3 views of the right hand have been submitted. There is overlap of the osseous structures at the fourth PIP joint suspicious for subluxation/dislocation. It is obscured on the lateral view. Soft tissue swelling is present at the fourth PIP. No fracture is identified. There are scattered arthritic changes, most severe at the first IP joint.  IMPRESSION: Findings suspicious for subluxation/dislocation at the fourth PIP joint.    TTE Echo Complete w/o contrast w/ Doppler  03/05/2020  Study Quality: Study quality was good.  CONCLUSIONS:   1. There is trace tricuspid regurgitation. Pulmonary artery systolic pressure (estimated using the tricuspid regurgitant gradient and an estimate of right atrial pressure) is 39 mmHg.   2. Normal left and right ventricular size and systolic function.   3. No pericardial effusion.

## 2020-03-06 NOTE — PROGRESS NOTE ADULT - ASSESSMENT
per Internal Medicine    79M with no known PMH (has not seen a PMD in decades), initially presented to [facility] 3/2 for fall, returned 3/4 due to additional falls, found to have rhabdomyolysis. Pt transferred to [facility] for further evaluation and admitted to [facility] for workup of falls and management of rhabdomyolysis and LE cellulitis.     Problem/Plan - 1:  ·  Problem: Gait instability.  Plan: P/w unsteady gait x5d and 6 falls per patient. He claims all of them have been mechanical and due to instability on R foot/ankle. Denies LOC, headache, dizziness, vertigo, blurry vision, palpitations. Neuro exam s/f diminished sensation in LLE, ?RLE, remainder wnl. Etiology most likely deconditioning and malnutrition in setting of frailty of physical exam, endorsed decreased PO, diminished sensation (2/2 vitamin def.) Other etiologies include: peripheral neuropathy, spinal cord involvement in setting of cervical spondylosis), Unlikely syncopal given lack of LOC and negative neck angio, absence of arrythmia on ECG.  - CTH, CTA head&neck negative for hemorrhage or infarct. CT cervical spine w/ cervical spondylosis.  - MRI Lumbar spine w/o contrast to search for nerve impingement given CT showing cervical spondylosis. Will f/u results.  - f/u b12 and folate due to malnutrition and macrocytic anemia seen on CBC.  - f/u RPR  - Pending PT/OT eval.     Problem/Plan - 2:  ·  Problem: Rhabdomyolysis.  Plan: Resolving. Pt presented [place] [facility] [place] [address] w/ positive blood in urine. Likely due to multiple falls in the setting of malnutrition. CK responsive to IV fluids, most recent CK 3374.  - Renal function is preserved w/ normal BUN/Cr.   - transaminitis ( and ), likely 2/2 rhabdo. Transaminitis is resolving w/ current  and ALT 69.  - Pt finished LR@125cc/hr, encourage PO fluids for now  - No need for trending of CK given improvement and response to fluids.     Problem/Plan - 3:  ·  Problem: Anemia, unspecified type.  Plan: Hgb 9.8 w/ . Macrocytic anemia likely 2/2 vitamin B12 deficiency due to malnutrition versus hypo/hyper thyroidism. Initial weakness may be d/t anemia.  Dimimished sensation in LE may be due to B12 def  -Fe low, however ferritin wnl making iron-deficiency less likely.   - F/u B12 and folate  - f/u TSH  - Will continue to monitor CBC.     Problem/Plan - 4:  ·  Problem: Disorder of skin and subcutaneous tissue.  Plan: RLE w/ demarcated erythema w/o rubor, purulence, or tender to palpation. Afebrile, initial leukocytosis on admission however likely hemoconcentration in setting of malnutrition, most recent WBC 5. Pt started on ceftriaxone for LE cellulitis, transitioned to keflex.  -Erythema appears to be 2/2 chronic fungal infxn. DDX includes fungal infxn vs bacterial cellulitis vs. traumatic skin lesion from fall.  - Wound care consulted, f/u recs  - Will continue off abx for now.     Problem/Plan - 5:  ·  Problem: Onychomycosis.  Plan: Extensive plaques present b/l LEs, chronic, untreated in past.  - Pt to follow up as outpatient for systemic tx.     Problem/Plan - 6:  Problem: Troponin level elevated. Plan: [facility]18 [facility] [place] [address]. TWI in inferior and lateral leads on EKG however low concern for NSTEMI by cardiology. Etiology of ischemic changes likely demand ischemia 2/2 rhabdo. Pt continues to deny chest pain or palpitations. Repeat EKG w/ resolution of ischemic changes.   -Troponin trended to peak, no need to further trend at this time.  -Continue to monitor for chest pain  -Of note, pt w/ , however no signs of fluid overload on physical exam (no JVD, LE edema, lungs CTAB). ECHO w/o cardiac dysfunction.    Problem/Plan - 7:  ·  Problem: Scoliosis, unspecified scoliosis type, unspecified spinal region.  Plan: Seen on CT a/p. Possible mechanical cause of gait issues; 5/5 motor strength of the lower ext  - Further imaging w/ MRI lumbar spine ordered for further clarification  - F/u PT/OT recs  - Will likely need rollator instead of cane for ambulation.     Problem/Plan - 8:  ·  Problem: Nutrition, metabolism, and development symptoms.  Plan: Nutrition consult.  Fluids: completed LR @ 125/hr, encourage PO intake   Electrolytes-replete as needed  Nutrition - DASH/TLC  Code- Full Code  DVT ppx: Lovenox  GI ppx: none needed  DIspo: F.

## 2020-03-07 ENCOUNTER — TRANSCRIPTION ENCOUNTER (OUTPATIENT)
Age: 80
End: 2020-03-07

## 2020-03-07 VITALS
DIASTOLIC BLOOD PRESSURE: 80 MMHG | SYSTOLIC BLOOD PRESSURE: 138 MMHG | RESPIRATION RATE: 18 BRPM | HEART RATE: 76 BPM | TEMPERATURE: 98 F | OXYGEN SATURATION: 96 %

## 2020-03-07 DIAGNOSIS — E44.0 MODERATE PROTEIN-CALORIE MALNUTRITION: ICD-10-CM

## 2020-03-07 DIAGNOSIS — S63.209S: ICD-10-CM

## 2020-03-07 PROCEDURE — 83550 IRON BINDING TEST: CPT

## 2020-03-07 PROCEDURE — 93306 TTE W/DOPPLER COMPLETE: CPT

## 2020-03-07 PROCEDURE — 84443 ASSAY THYROID STIM HORMONE: CPT

## 2020-03-07 PROCEDURE — 83735 ASSAY OF MAGNESIUM: CPT

## 2020-03-07 PROCEDURE — 97161 PT EVAL LOW COMPLEX 20 MIN: CPT

## 2020-03-07 PROCEDURE — 85610 PROTHROMBIN TIME: CPT

## 2020-03-07 PROCEDURE — 73130 X-RAY EXAM OF HAND: CPT

## 2020-03-07 PROCEDURE — 82550 ASSAY OF CK (CPK): CPT

## 2020-03-07 PROCEDURE — 86780 TREPONEMA PALLIDUM: CPT

## 2020-03-07 PROCEDURE — 72125 CT NECK SPINE W/O DYE: CPT

## 2020-03-07 PROCEDURE — 82728 ASSAY OF FERRITIN: CPT

## 2020-03-07 PROCEDURE — 71045 X-RAY EXAM CHEST 1 VIEW: CPT

## 2020-03-07 PROCEDURE — 70450 CT HEAD/BRAIN W/O DYE: CPT

## 2020-03-07 PROCEDURE — 93005 ELECTROCARDIOGRAM TRACING: CPT

## 2020-03-07 PROCEDURE — 96361 HYDRATE IV INFUSION ADD-ON: CPT

## 2020-03-07 PROCEDURE — 84100 ASSAY OF PHOSPHORUS: CPT

## 2020-03-07 PROCEDURE — 83036 HEMOGLOBIN GLYCOSYLATED A1C: CPT

## 2020-03-07 PROCEDURE — 80307 DRUG TEST PRSMV CHEM ANLYZR: CPT

## 2020-03-07 PROCEDURE — 85730 THROMBOPLASTIN TIME PARTIAL: CPT

## 2020-03-07 PROCEDURE — 36415 COLL VENOUS BLD VENIPUNCTURE: CPT

## 2020-03-07 PROCEDURE — 96365 THER/PROPH/DIAG IV INF INIT: CPT | Mod: XU

## 2020-03-07 PROCEDURE — 80061 LIPID PANEL: CPT

## 2020-03-07 PROCEDURE — 83605 ASSAY OF LACTIC ACID: CPT

## 2020-03-07 PROCEDURE — 97535 SELF CARE MNGMENT TRAINING: CPT

## 2020-03-07 PROCEDURE — 82746 ASSAY OF FOLIC ACID SERUM: CPT

## 2020-03-07 PROCEDURE — 80048 BASIC METABOLIC PNL TOTAL CA: CPT

## 2020-03-07 PROCEDURE — 70498 CT ANGIOGRAPHY NECK: CPT

## 2020-03-07 PROCEDURE — 74177 CT ABD & PELVIS W/CONTRAST: CPT

## 2020-03-07 PROCEDURE — 70496 CT ANGIOGRAPHY HEAD: CPT

## 2020-03-07 PROCEDURE — 81001 URINALYSIS AUTO W/SCOPE: CPT

## 2020-03-07 PROCEDURE — 99285 EMERGENCY DEPT VISIT HI MDM: CPT | Mod: 25

## 2020-03-07 PROCEDURE — 85025 COMPLETE CBC W/AUTO DIFF WBC: CPT

## 2020-03-07 PROCEDURE — 82553 CREATINE MB FRACTION: CPT

## 2020-03-07 PROCEDURE — 86340 INTRINSIC FACTOR ANTIBODY: CPT

## 2020-03-07 PROCEDURE — 84484 ASSAY OF TROPONIN QUANT: CPT

## 2020-03-07 PROCEDURE — 82607 VITAMIN B-12: CPT

## 2020-03-07 PROCEDURE — 80053 COMPREHEN METABOLIC PANEL: CPT

## 2020-03-07 PROCEDURE — 97530 THERAPEUTIC ACTIVITIES: CPT

## 2020-03-07 PROCEDURE — 83540 ASSAY OF IRON: CPT

## 2020-03-07 PROCEDURE — 83880 ASSAY OF NATRIURETIC PEPTIDE: CPT

## 2020-03-07 PROCEDURE — 99239 HOSP IP/OBS DSCHRG MGMT >30: CPT

## 2020-03-07 PROCEDURE — 85027 COMPLETE CBC AUTOMATED: CPT

## 2020-03-07 PROCEDURE — 72148 MRI LUMBAR SPINE W/O DYE: CPT

## 2020-03-07 PROCEDURE — 87040 BLOOD CULTURE FOR BACTERIA: CPT

## 2020-03-07 RX ORDER — PREGABALIN 225 MG/1
1 CAPSULE ORAL
Qty: 14 | Refills: 0
Start: 2020-03-07 | End: 2020-03-20

## 2020-03-07 RX ADMIN — ENOXAPARIN SODIUM 40 MILLIGRAM(S): 100 INJECTION SUBCUTANEOUS at 08:39
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
## 2020-03-07 NOTE — DISCHARGE NOTE NURSING/CASE MANAGEMENT/SOCIAL WORK - PATIENT PORTAL LINK FT
You can access the FollowMyHealth Patient Portal offered by [facility] by registering at the following website: [email or web address]. By joining [email or web address]’s FollowMyHealth portal, you will also be able to view your health information using other applications (apps) compatible with our system.

## 2020-03-07 NOTE — PROGRESS NOTE ADULT - SUBJECTIVE AND OBJECTIVE BOX
Patient is a 79y old  Male who presents with a chief complaint of Weakness (06 Mar 2020 15:25)      INTERVAL HPI/OVERNIGHT EVENTS:    Pt. seen and examined this morning  Pt. feels well, eager to go to [facility] for PT/OT    Review of Systems: 12 point review of systems otherwise negative    MEDICATIONS  (STANDING):  cyanocobalamin Injectable 1000 MICROGram(s) IntraMuscular every 24 hours  enoxaparin Injectable 40 milliGRAM(s) SubCutaneous every 24 hours  influenza   Vaccine 0.5 milliLiter(s) IntraMuscular once    MEDICATIONS  (PRN):      Allergies    No Known Allergies    Intolerances          Vital Signs Last 24 Hrs  T(C): 36.9 (07 Mar 2020 08:40), Max: 36.9 (07 Mar 2020 08:40)  T(F): 98.4 (07 Mar 2020 08:40), Max: 98.4 (07 Mar 2020 08:40)  HR: 76 (07 Mar 2020 08:40) (71 - 99)  BP: 138/80 (07 Mar 2020 08:40) (112/70 - 138/80)  BP(mean): --  RR: 18 (07 Mar 2020 08:40) (18 - 18)  SpO2: 96% (07 Mar 2020 08:40) (95% - 97%)  CAPILLARY BLOOD GLUCOSE           @ 07: @ 07:00  --------------------------------------------------------  IN: 1375 mL / OUT: 2050 mL / NET: -675 mL     @ : @ 12:46  --------------------------------------------------------  IN: 180 mL / OUT: 350 mL / NET: -170 mL        Physical Exam:  (this morning)  Daily     Daily Weight in k.5 (06 Mar 2020 14:40)  General:  comfortable-appearing in NAD, underweight  HEENT: MMM  CV:  no JVD  Abdomen:  soft NT ND  Extremities:  B/L LE w/ abrasions, RLE w/ resolving erythema, R 4th finger discolored (chronic, per Pt.)  Skin:  [facility][1]  Neuro:  AAOx3, B/L  strength /    LABS:                        10.6   6.31  )-----------( 172      ( 06 Mar 2020 05:41 )             32.9     03-06    140  |  104  |  18  ----------------------------<  97  4.1   |  22  |  0.78    Ca    8.0<L>      06 Mar 2020 05:41  Mg     1.7         TPro  5.0<L>  /  Alb  3.0<L>  /  TBili  0.6  /  DBili  x   /  AST  125<H>  /  ALT  69<H>  /  AlkPhos  71  -            RADIOLOGY & ADDITIONAL TESTS:    ---------------------------------------------------------------------------  I personally reviewed: [  ]EKG   [  ]CXR    [  ] CT    [  ]Other  ---------------------------------------------------------------------------  PLEASE CHECK WHEN PRESENT:     [  ]Heart Failure     [  ] Acute     [  ] Acute on Chronic     [  ] Chronic  -------------------------------------------------------------------     [  ]Diastolic [HFpEF]     [  ]Systolic [HFrEF]     [  ]Combined [HFpEF & HFrEF]     [  ]Other:  -------------------------------------------------------------------  [  ]LANCE     [  ]ATN     [  ]Reneal Medullary Necrosis     [  ]Renal Cortical Necrosis     [  ]Other Pathological Lesions:    [  ]CKD 1  [  ]CKD 2  [  ]CKD 3  [  ]CKD 4  [  ]CKD 5  [  ]Other  -------------------------------------------------------------------  [  ]Other/Unspecified:    --------------------------------------------------------------------    Abdominal Nutritional Status  [  ]Malnutrition: See Nutrition Note  [  ]Cachexia  [  ]Other:   [  ]Supplement Ordered:  [  ]Morbid Obesity (BMI >=40]

## 2020-03-07 NOTE — PROGRESS NOTE ADULT - PROBLEM SELECTOR PROBLEM 4
Cellulitis of right lower extremity
Disorder of skin and subcutaneous tissue
Elevated troponin
Elevated troponin
Disorder of skin and subcutaneous tissue

## 2020-03-09 LAB
CULTURE RESULTS: SIGNIFICANT CHANGE UP
CULTURE RESULTS: SIGNIFICANT CHANGE UP
IF BLOCK AB SER-ACNC: SIGNIFICANT CHANGE UP
SPECIMEN SOURCE: SIGNIFICANT CHANGE UP
SPECIMEN SOURCE: SIGNIFICANT CHANGE UP

## 2020-03-10 PROBLEM — Z00.00 ENCOUNTER FOR PREVENTIVE HEALTH EXAMINATION: Status: ACTIVE | Noted: 2020-03-10

## 2020-03-12 DIAGNOSIS — M41.9 SCOLIOSIS, UNSPECIFIED: ICD-10-CM
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
## 2020-03-12 DIAGNOSIS — I24.8 OTHER FORMS OF ACUTE ISCHEMIC HEART DISEASE: ICD-10-CM

## 2020-03-12 DIAGNOSIS — L98.9 DISORDER OF THE SKIN AND SUBCUTANEOUS TISSUE, UNSPECIFIED: ICD-10-CM

## 2020-03-12 DIAGNOSIS — M43.02 SPONDYLOLYSIS, CERVICAL REGION: ICD-10-CM

## 2020-03-12 DIAGNOSIS — R74.0 NONSPECIFIC ELEVATION OF LEVELS OF TRANSAMINASE AND LACTIC ACID DEHYDROGENASE [LDH]: ICD-10-CM

## 2020-03-12 DIAGNOSIS — E44.0 MODERATE PROTEIN-CALORIE MALNUTRITION: ICD-10-CM

## 2020-03-12 DIAGNOSIS — L03.115 CELLULITIS OF RIGHT LOWER LIMB: ICD-10-CM

## 2020-03-12 DIAGNOSIS — R79.89 OTHER SPECIFIED ABNORMAL FINDINGS OF BLOOD CHEMISTRY: ICD-10-CM

## 2020-03-12 DIAGNOSIS — N39.0 URINARY TRACT INFECTION, SITE NOT SPECIFIED: ICD-10-CM

## 2020-03-12 DIAGNOSIS — R53.1 WEAKNESS: ICD-10-CM

## 2020-03-12 DIAGNOSIS — E53.8 DEFICIENCY OF OTHER SPECIFIED B GROUP VITAMINS: ICD-10-CM

## 2020-03-12 DIAGNOSIS — D53.9 NUTRITIONAL ANEMIA, UNSPECIFIED: ICD-10-CM

## 2020-03-12 DIAGNOSIS — S63.204A: ICD-10-CM

## 2020-03-12 DIAGNOSIS — E87.6 HYPOKALEMIA: ICD-10-CM

## 2020-03-12 DIAGNOSIS — T79.6XXA TRAUMATIC ISCHEMIA OF MUSCLE, INITIAL ENCOUNTER: ICD-10-CM

## 2020-03-12 DIAGNOSIS — B35.1 TINEA UNGUIUM: ICD-10-CM

## 2020-03-20 ENCOUNTER — APPOINTMENT (OUTPATIENT)
Dept: INTERNAL MEDICINE | Facility: CLINIC | Age: 80
End: 2020-03-20

## 2020-08-01 ENCOUNTER — EMERGENCY (EMERGENCY)
Facility: HOSPITAL | Age: 80
LOS: 1 days | Discharge: HOME | End: 2020-08-01
Attending: EMERGENCY MEDICINE | Admitting: EMERGENCY MEDICINE
Payer: MEDICARE

## 2020-08-01 VITALS
RESPIRATION RATE: 17 BRPM | WEIGHT: 139.99 LBS | DIASTOLIC BLOOD PRESSURE: 73 MMHG | SYSTOLIC BLOOD PRESSURE: 110 MMHG | TEMPERATURE: 98 F | HEART RATE: 110 BPM | HEIGHT: 69 IN | OXYGEN SATURATION: 98 %

## 2020-08-01 VITALS
DIASTOLIC BLOOD PRESSURE: 71 MMHG | TEMPERATURE: 98 F | RESPIRATION RATE: 18 BRPM | HEART RATE: 83 BPM | OXYGEN SATURATION: 97 % | SYSTOLIC BLOOD PRESSURE: 113 MMHG

## 2020-08-01 DIAGNOSIS — R41.0 DISORIENTATION, UNSPECIFIED: ICD-10-CM

## 2020-08-01 DIAGNOSIS — R41.82 ALTERED MENTAL STATUS, UNSPECIFIED: ICD-10-CM

## 2020-08-01 PROCEDURE — 82962 GLUCOSE BLOOD TEST: CPT

## 2020-08-01 PROCEDURE — 99283 EMERGENCY DEPT VISIT LOW MDM: CPT

## 2020-08-01 NOTE — ED PROVIDER NOTE - CLINICAL SUMMARY MEDICAL DECISION MAKING FREE TEXT BOX
Patient is a 79 year old male presenting today with confusion about how to get home. He does know where he lives and the address he gave us has been confirmed. Concern for beginning of dementia. Pt without family or friends around and lives alone. Likely starting to decompensate.  involved for likely APS referral. Police to take patient home. He has his keys. No concern at this time for acute AMS, most likely slowly progressing dementia.

## 2020-08-01 NOTE — ED PROVIDER NOTE - CONDITION AT DISCHARGE:
Patient will continue on Cosopt (Timolol/Dorzolamide) which is a blue top drop 2x/day (12 hours apart) in the RIGHT EYE ONLY, Latanoprost which is a teal top drop at bedtime in both eyes, and Alphagan (Brimonidine) which is a purple top drop 2x/day (12 hours apart) in the RIGHT EYE ONLY.  Patient will follow up in [place] in 1-2 months with evaluation.  
Improved

## 2020-08-01 NOTE — ED PROVIDER NOTE - OBJECTIVE STATEMENT
Patient is a 79 year old male who was admitted to [facility] in March 2020 for rhabdomyolysis, who presents today via EMS after wandering around in the streets in [place] in the heat. He is not weak today. He was given a plan by his boss and got confused about how to get home. Stated his address which is the same that is in his chart from prior admission, although he was confused about which train to take. Patient is AO x3, knows that he is in the ER, knows the date and year, and cannot remember the president's name but can describe things about him. Patient has no family in the city, and lives in the basement apartment of his bosses house. Patient is a 79 year old male who was admitted to [facility] in March 2020 for rhabdomyolysis, who presents today via EMS after wandering around in the streets in [place] in the heat. He is not weak today. He was given a plan by his boss to go to the bank on [address], and got confused about how to get home. Stated his address which is the same that is in his chart from prior admission, although he was confused about which train to take, states he supposed to take the 6 but doesn't remember where to transfer, and that he needs to get to [employer] station. Patient is AO x3, knows that he is in the ER, knows the date and year, and cannot remember the president's name but can describe things about him. Patient has no family in the city, and lives in the basement apartment of his bosses house. Still able to do his activities of daily living, but admits starting to have some confusion about how to get around.

## 2020-08-01 NOTE — ED ADULT TRIAGE NOTE - CHIEF COMPLAINT QUOTE
Per EMS, "He was wandering the streets, he said his boss sent him to the city."  Patient denies any CP, SOB, dizzy or any other complaints at this time.  .

## 2020-08-01 NOTE — ED PROVIDER NOTE - PHYSICAL EXAMINATION
CONSTITUTIONAL: Well-appearing; well-nourished; in no apparent distress.   HEAD: Normocephalic; atraumatic.   EYES:  conjunctiva and sclera clear  ENT: normal nose; no rhinorrhea;  NECK: Supple; full ROM  RESPIRATORY: Breathing easily; no resp difficulty  EXT: No cyanosis or edema;  SKIN: Normal for age and race; warm; dry; good turgor; no apparent lesions or rash.   NEURO: A & O x 3; face symmetric; grossly unremarkable. ambulatory without issue.  PSYCHOLOGICAL: The patient’s mood and manner are appropriate.

## 2020-08-01 NOTE — ED ADULT NURSE NOTE - CHPI ED NUR SYMPTOMS NEG
no fever/no weakness/no loss of consciousness/no vomiting/no blurred vision/no nausea/no numbness/no dizziness/no change in level of consciousness

## 2020-08-01 NOTE — ED PROVIDER NOTE - PATIENT PORTAL LINK FT
You can access the FollowMyHealth Patient Portal offered by [facility] by registering at the following website: [email or web address]. By joining [employer]’s FollowMyHealth portal, you will also be able to view your health information using other applications (apps) compatible with our system.

## 2020-08-26 NOTE — PHYSICAL THERAPY INITIAL EVALUATION ADULT - IMPAIRMENTS FOUND, PT EVAL
No treatment at this time. Observation recommended. aerobic capacity/endurance/gait, locomotion, and balance

## 2022-05-13 NOTE — DISCHARGE NOTE PROVIDER - NSFOLLOWUPCLINICS_GEN_ALL_ED_FT
Detail Level: Detailed
[facility]  Family Medicine  [facility]  [place], [place] [facility]  Phone: [phone]  Fax:   Follow Up Time:

## 2022-10-11 NOTE — ED PROVIDER NOTE - SKIN NEGATIVE STATEMENT, MLM
Medical Necessity Clause: This procedure was medically necessary because the lesion that was treated was: no abrasions and no rashes.

## 2023-03-09 NOTE — ED ADULT TRIAGE NOTE - HEART RATE (BEATS/MIN)
Patient has voided 400 cc will continue to monitor.  Dressing remains clean dry and intact pulses are palpable 110

## 2023-11-22 NOTE — PROGRESS NOTE ADULT - PROBLEM/PLAN-3
[FreeTextEntry1] : left grade I varicocele
DISPLAY PLAN FREE TEXT
yes

## 2024-02-21 NOTE — DISCHARGE NOTE PROVIDER - NSDCACTIVITY_GEN_ALL_CORE
Showering allowed/Walking - Indoors allowed/Bathing allowed/Walking - Outdoors allowed
Previously Declined (within the last year)

## 2024-11-12 NOTE — ED ADULT NURSE REASSESSMENT NOTE - NS ED NURSE REASSESS COMMENT FT1
Pt. was d/c home with ARIADNE.  Pt. will be accompanied by ARIADNE to his home and delivered to his family waiting at home.  Pt. left in no acute distress with strong, steady gait.
No